# Patient Record
Sex: MALE | Race: WHITE | NOT HISPANIC OR LATINO | Employment: FULL TIME | URBAN - METROPOLITAN AREA
[De-identification: names, ages, dates, MRNs, and addresses within clinical notes are randomized per-mention and may not be internally consistent; named-entity substitution may affect disease eponyms.]

---

## 2017-11-17 ENCOUNTER — HISTORICAL (OUTPATIENT)
Dept: RADIOLOGY | Facility: HOSPITAL | Age: 32
End: 2017-11-17

## 2017-11-28 ENCOUNTER — HISTORICAL (OUTPATIENT)
Dept: ADMINISTRATIVE | Facility: HOSPITAL | Age: 32
End: 2017-11-28

## 2017-11-28 LAB
ALBUMIN SERPL-MCNC: 4.7 GM/DL (ref 3.4–5)
BUN SERPL-MCNC: 15 MG/DL (ref 7–18)
CALCIUM SERPL-MCNC: 9.7 MG/DL (ref 8.5–10.1)
CHLORIDE SERPL-SCNC: 102 MMOL/L (ref 98–107)
CO2 SERPL-SCNC: 28 MMOL/L (ref 21–32)
CREAT SERPL-MCNC: 1.4 MG/DL (ref 0.6–1.3)
FSH SERPL-ACNC: 11.3 MIU/ML (ref 1.5–15)
GLUCOSE SERPL-MCNC: 83 MG/DL (ref 74–106)
LH SERPL-ACNC: 0.4 MIU/ML (ref 1.2–10.6)
PHOSPHATE SERPL-MCNC: 3.2 MG/DL (ref 2.5–4.9)
POTASSIUM SERPL-SCNC: 4.3 MMOL/L (ref 3.5–5.1)
PROLACTIN LEVEL (OHS): 68.2 NG/ML (ref 2.5–17.4)
PTH-INTACT SERPL-MCNC: 51.6 PG/ML (ref 13.8–85)
SODIUM SERPL-SCNC: 139 MMOL/L (ref 136–145)
T4 FREE SERPL-MCNC: 0.11 NG/DL (ref 0.76–1.46)
TSH SERPL-ACNC: >500 MIU/L (ref 0.36–3.74)

## 2017-11-30 ENCOUNTER — HISTORICAL (OUTPATIENT)
Dept: ADMINISTRATIVE | Facility: HOSPITAL | Age: 32
End: 2017-11-30

## 2017-11-30 ENCOUNTER — HISTORICAL (OUTPATIENT)
Dept: RADIOLOGY | Facility: HOSPITAL | Age: 32
End: 2017-11-30

## 2017-11-30 LAB — CORTIS SERPL-SCNC: 13 MCG/DL

## 2017-12-05 ENCOUNTER — HISTORICAL (OUTPATIENT)
Dept: INFUSION THERAPY | Facility: HOSPITAL | Age: 32
End: 2017-12-05

## 2017-12-05 LAB
APPEARANCE, UA: CLEAR
BACTERIA #/AREA URNS AUTO: ABNORMAL /[HPF]
BILIRUB UR QL STRIP: NEGATIVE
COLOR UR: NORMAL
CORTIS 1H P CHAL SERPL-SCNC: 26 MCG/DL
CORTIS 30M P CHAL SERPL-SCNC: 20.6 MCG/DL
CORTIS SERPL-SCNC: 8.5 MCG/DL
GLUCOSE (UA): NORMAL
HGB UR QL STRIP: NEGATIVE
HYALINE CASTS #/AREA URNS LPF: ABNORMAL /[LPF]
KETONES UR QL STRIP: NEGATIVE
LEUKOCYTE ESTERASE UR QL STRIP: NEGATIVE
NITRITE UR QL STRIP: NEGATIVE
PH UR STRIP: 6.5 [PH] (ref 4.5–8)
PROT UR QL STRIP: NEGATIVE
RBC #/AREA URNS AUTO: ABNORMAL /[HPF]
SP GR UR STRIP: 1.01 (ref 1–1.03)
SQUAMOUS #/AREA URNS LPF: ABNORMAL /[LPF]
UROBILINOGEN UR STRIP-ACNC: NORMAL MG/DL
WBC #/AREA URNS AUTO: ABNORMAL /HPF

## 2018-01-10 ENCOUNTER — HISTORICAL (OUTPATIENT)
Dept: FAMILY MEDICINE | Facility: CLINIC | Age: 33
End: 2018-01-10

## 2018-01-10 LAB
T4 FREE SERPL-MCNC: 0.31 NG/DL (ref 0.76–1.46)
TSH SERPL-ACNC: 383 MIU/L (ref 0.36–3.74)

## 2018-02-09 ENCOUNTER — HISTORICAL (OUTPATIENT)
Dept: FAMILY MEDICINE | Facility: CLINIC | Age: 33
End: 2018-02-09

## 2018-02-09 LAB
T4 FREE SERPL-MCNC: 0.54 NG/DL (ref 0.76–1.46)
TSH SERPL-ACNC: 226 MIU/L (ref 0.36–3.74)

## 2018-03-12 ENCOUNTER — HISTORICAL (OUTPATIENT)
Dept: CARDIOLOGY | Facility: CLINIC | Age: 33
End: 2018-03-12

## 2018-03-12 LAB
FSH SERPL-ACNC: 6.2 MIU/ML (ref 1.5–15)
LH SERPL-ACNC: 3.4 MIU/ML (ref 1.2–10.6)
PROLACTIN LEVEL (OHS): 35.3 NG/ML (ref 2.5–17.4)
T4 FREE SERPL-MCNC: 0.9 NG/DL (ref 0.76–1.46)
TSH SERPL-ACNC: 9.88 MIU/L (ref 0.36–3.74)

## 2018-03-19 ENCOUNTER — HISTORICAL (OUTPATIENT)
Dept: RADIOLOGY | Facility: HOSPITAL | Age: 33
End: 2018-03-19

## 2018-05-09 ENCOUNTER — HISTORICAL (OUTPATIENT)
Dept: FAMILY MEDICINE | Facility: CLINIC | Age: 33
End: 2018-05-09

## 2018-05-09 LAB
BUN SERPL-MCNC: 12 MG/DL (ref 7–18)
CALCIUM SERPL-MCNC: 9.3 MG/DL (ref 8.5–10.1)
CHLORIDE SERPL-SCNC: 110 MMOL/L (ref 98–107)
CO2 SERPL-SCNC: 27 MMOL/L (ref 21–32)
CREAT SERPL-MCNC: 0.7 MG/DL (ref 0.6–1.3)
CREAT/UREA NIT SERPL: 17
GLUCOSE SERPL-MCNC: 85 MG/DL (ref 74–106)
POTASSIUM SERPL-SCNC: 4.4 MMOL/L (ref 3.5–5.1)
PROLACTIN LEVEL (OHS): 28.2 NG/ML (ref 2.5–17.4)
SODIUM SERPL-SCNC: 140 MMOL/L (ref 136–145)
T4 FREE SERPL-MCNC: 0.94 NG/DL (ref 0.76–1.46)
TSH SERPL-ACNC: 6.37 MIU/L (ref 0.36–3.74)

## 2018-06-29 ENCOUNTER — HISTORICAL (OUTPATIENT)
Dept: FAMILY MEDICINE | Facility: CLINIC | Age: 33
End: 2018-06-29

## 2018-06-29 LAB
DEPRECATED CALCIDIOL+CALCIFEROL SERPL-MC: 29.49 NG/ML (ref 30–80)
PROLACTIN LEVEL (OHS): 31.6 NG/ML (ref 2.5–17.4)
T4 FREE SERPL-MCNC: 1.03 NG/DL (ref 0.76–1.46)
TSH SERPL-ACNC: 6.11 MIU/L (ref 0.36–3.74)

## 2018-08-03 ENCOUNTER — HISTORICAL (OUTPATIENT)
Dept: ADMINISTRATIVE | Facility: HOSPITAL | Age: 33
End: 2018-08-03

## 2018-08-03 LAB
DEPRECATED CALCIDIOL+CALCIFEROL SERPL-MC: 71.67 NG/ML (ref 30–80)
PROLACTIN LEVEL (OHS): 19.1 NG/ML (ref 2.5–17.4)
T4 FREE SERPL-MCNC: 0.94 NG/DL (ref 0.76–1.46)
TSH SERPL-ACNC: 1.13 MIU/L (ref 0.36–3.74)

## 2018-11-05 ENCOUNTER — HISTORICAL (OUTPATIENT)
Dept: FAMILY MEDICINE | Facility: CLINIC | Age: 33
End: 2018-11-05

## 2018-11-05 LAB
DEPRECATED CALCIDIOL+CALCIFEROL SERPL-MC: 38.4 NG/ML (ref 30–80)
PROLACTIN LEVEL (OHS): 19.3 NG/ML (ref 2.5–17.4)
TSH SERPL-ACNC: 0.41 MIU/L (ref 0.36–3.74)

## 2018-11-29 ENCOUNTER — HISTORICAL (OUTPATIENT)
Dept: RADIOLOGY | Facility: HOSPITAL | Age: 33
End: 2018-11-29

## 2018-11-29 LAB
ALBUMIN SERPL-MCNC: 3.7 GM/DL (ref 3.4–5)
ALBUMIN/GLOB SERPL: 1 RATIO (ref 1–2)
ALP SERPL-CCNC: 179 UNIT/L (ref 45–117)
ALT SERPL-CCNC: 27 UNIT/L (ref 12–78)
AST SERPL-CCNC: 22 UNIT/L (ref 15–37)
BILIRUB SERPL-MCNC: 0.2 MG/DL (ref 0.2–1)
BILIRUBIN DIRECT+TOT PNL SERPL-MCNC: <0.1 MG/DL
BILIRUBIN DIRECT+TOT PNL SERPL-MCNC: ABNORMAL MG/DL
BUN SERPL-MCNC: 16 MG/DL (ref 7–18)
CALCIUM SERPL-MCNC: 9.1 MG/DL (ref 8.5–10.1)
CHLORIDE SERPL-SCNC: 107 MMOL/L (ref 98–107)
CO2 SERPL-SCNC: 28 MMOL/L (ref 21–32)
CREAT SERPL-MCNC: 0.9 MG/DL (ref 0.6–1.3)
GLOBULIN SER-MCNC: 3.7 GM/ML (ref 2.3–3.5)
GLUCOSE SERPL-MCNC: 71 MG/DL (ref 74–106)
POTASSIUM SERPL-SCNC: 4.5 MMOL/L (ref 3.5–5.1)
PROT SERPL-MCNC: 7.4 GM/DL (ref 6.4–8.2)
SODIUM SERPL-SCNC: 142 MMOL/L (ref 136–145)

## 2019-01-30 ENCOUNTER — HISTORICAL (OUTPATIENT)
Dept: ADMINISTRATIVE | Facility: HOSPITAL | Age: 34
End: 2019-01-30

## 2019-01-30 LAB
DEPRECATED CALCIDIOL+CALCIFEROL SERPL-MC: 25.95 NG/ML (ref 30–80)
PROLACTIN LEVEL (OHS): 18.9 NG/ML (ref 2.5–17.4)
T4 FREE SERPL-MCNC: 1.06 NG/DL (ref 0.76–1.46)
TSH SERPL-ACNC: 0.45 MIU/L (ref 0.36–3.74)

## 2019-06-21 ENCOUNTER — HISTORICAL (OUTPATIENT)
Dept: ADMINISTRATIVE | Facility: HOSPITAL | Age: 34
End: 2019-06-21

## 2019-06-21 LAB
ABS NEUT (OLG): 3.97 X10(3)/MCL (ref 2.1–9.2)
ALBUMIN SERPL-MCNC: 3.7 GM/DL (ref 3.4–5)
ALBUMIN/GLOB SERPL: 1 RATIO (ref 1.1–2)
ALP SERPL-CCNC: 149 UNIT/L (ref 45–117)
ALT SERPL-CCNC: 23 UNIT/L (ref 12–78)
AST SERPL-CCNC: 13 UNIT/L (ref 15–37)
BASOPHILS # BLD AUTO: 0.08 X10(3)/MCL
BASOPHILS NFR BLD AUTO: 1 %
BILIRUB SERPL-MCNC: 0.3 MG/DL (ref 0.2–1)
BILIRUBIN DIRECT+TOT PNL SERPL-MCNC: <0.1 MG/DL
BILIRUBIN DIRECT+TOT PNL SERPL-MCNC: ABNORMAL MG/DL
BUN SERPL-MCNC: 16 MG/DL (ref 7–18)
CALCIUM SERPL-MCNC: 9.2 MG/DL (ref 8.5–10.1)
CHLORIDE SERPL-SCNC: 109 MMOL/L (ref 98–107)
CHOLEST SERPL-MCNC: 134 MG/DL
CHOLEST/HDLC SERPL: 4.6 {RATIO} (ref 0–5)
CO2 SERPL-SCNC: 27 MMOL/L (ref 21–32)
CREAT SERPL-MCNC: 0.9 MG/DL (ref 0.6–1.3)
DEPRECATED CALCIDIOL+CALCIFEROL SERPL-MC: 47.73 NG/ML (ref 30–80)
EOSINOPHIL # BLD AUTO: 0.66 10*3/UL
EOSINOPHIL NFR BLD AUTO: 8 %
ERYTHROCYTE [DISTWIDTH] IN BLOOD BY AUTOMATED COUNT: 16.1 % (ref 11.5–14.5)
GLOBULIN SER-MCNC: 3.6 GM/ML (ref 2.3–3.5)
GLUCOSE SERPL-MCNC: 81 MG/DL (ref 74–106)
HCT VFR BLD AUTO: 42.9 % (ref 40–51)
HDLC SERPL-MCNC: 29 MG/DL
HGB BLD-MCNC: 13.2 GM/DL (ref 13.5–17.5)
IMM GRANULOCYTES # BLD AUTO: 0.01 10*3/UL
IMM GRANULOCYTES NFR BLD AUTO: 0 %
LDLC SERPL CALC-MCNC: 79 MG/DL (ref 0–130)
LYMPHOCYTES # BLD AUTO: 2.12 X10(3)/MCL
LYMPHOCYTES NFR BLD AUTO: 26 % (ref 13–40)
MCH RBC QN AUTO: 24.3 PG (ref 26–34)
MCHC RBC AUTO-ENTMCNC: 30.8 GM/DL (ref 31–37)
MCV RBC AUTO: 79 FL (ref 80–100)
MONOCYTES # BLD AUTO: 1.17 X10(3)/MCL
MONOCYTES NFR BLD AUTO: 15 % (ref 4–12)
NEUTROPHILS # BLD AUTO: 3.97 X10(3)/MCL
NEUTROPHILS NFR BLD AUTO: 50 X10(3)/MCL
PLATELET # BLD AUTO: 257 X10(3)/MCL (ref 130–400)
PMV BLD AUTO: 13.3 FL (ref 7.4–10.4)
POTASSIUM SERPL-SCNC: 4.6 MMOL/L (ref 3.5–5.1)
PROT SERPL-MCNC: 7.3 GM/DL (ref 6.4–8.2)
RBC # BLD AUTO: 5.43 X10(6)/MCL (ref 4.5–5.9)
SODIUM SERPL-SCNC: 141 MMOL/L (ref 136–145)
TRIGL SERPL-MCNC: 128 MG/DL
VLDLC SERPL CALC-MCNC: 26 MG/DL
WBC # SPEC AUTO: 8 X10(3)/MCL (ref 4.5–11)

## 2019-06-25 ENCOUNTER — HISTORICAL (OUTPATIENT)
Dept: ADMINISTRATIVE | Facility: HOSPITAL | Age: 34
End: 2019-06-25

## 2019-06-25 LAB
PROLACTIN LEVEL (OHS): 14 NG/ML (ref 2.5–17.4)
T4 FREE SERPL-MCNC: 0.99 NG/DL (ref 0.76–1.46)
TSH SERPL-ACNC: 1.64 MIU/L (ref 0.36–3.74)

## 2019-12-03 ENCOUNTER — HISTORICAL (OUTPATIENT)
Dept: ADMINISTRATIVE | Facility: HOSPITAL | Age: 34
End: 2019-12-03

## 2019-12-03 LAB
BUN SERPL-MCNC: 14 MG/DL (ref 7–18)
CALCIUM SERPL-MCNC: 9.3 MG/DL (ref 8.5–10.1)
CHLORIDE SERPL-SCNC: 108 MMOL/L (ref 98–107)
CO2 SERPL-SCNC: 25 MMOL/L (ref 21–32)
CREAT SERPL-MCNC: 0.9 MG/DL (ref 0.6–1.3)
CREAT/UREA NIT SERPL: 16
GLUCOSE SERPL-MCNC: 83 MG/DL (ref 74–106)
POTASSIUM SERPL-SCNC: 4 MMOL/L (ref 3.5–5.1)
PROLACTIN LEVEL (OHS): 16.7 NG/ML (ref 2.5–17.4)
SODIUM SERPL-SCNC: 141 MMOL/L (ref 136–145)
T4 FREE SERPL-MCNC: 0.99 NG/DL (ref 0.76–1.46)
TSH SERPL-ACNC: 4.66 MIU/L (ref 0.36–3.74)

## 2020-06-11 ENCOUNTER — HISTORICAL (OUTPATIENT)
Dept: ADMINISTRATIVE | Facility: HOSPITAL | Age: 35
End: 2020-06-11

## 2020-06-11 LAB
BUN SERPL-MCNC: 20 MG/DL (ref 7–18)
CALCIUM SERPL-MCNC: 9.4 MG/DL (ref 8.5–10.1)
CHLORIDE SERPL-SCNC: 106 MMOL/L (ref 98–107)
CO2 SERPL-SCNC: 26 MMOL/L (ref 21–32)
CREAT SERPL-MCNC: 0.9 MG/DL (ref 0.6–1.3)
CREAT/UREA NIT SERPL: 22
GLUCOSE SERPL-MCNC: 77 MG/DL (ref 74–106)
POTASSIUM SERPL-SCNC: 3.9 MMOL/L (ref 3.5–5.1)
PROLACTIN LEVEL (OHS): 22 NG/ML (ref 2.5–17.4)
SODIUM SERPL-SCNC: 139 MMOL/L (ref 136–145)
T4 FREE SERPL-MCNC: 1.18 NG/DL (ref 0.76–1.46)
TSH SERPL-ACNC: 19.02 MIU/L (ref 0.36–3.74)

## 2021-02-04 ENCOUNTER — HISTORICAL (OUTPATIENT)
Dept: ADMINISTRATIVE | Facility: HOSPITAL | Age: 36
End: 2021-02-04

## 2021-02-04 LAB
ABS NEUT (OLG): 4.68 X10(3)/MCL (ref 2.1–9.2)
ALBUMIN SERPL-MCNC: 4.4 GM/DL (ref 3.5–5)
ALBUMIN/GLOB SERPL: 1.2 RATIO (ref 1.1–2)
ALP SERPL-CCNC: 94 UNIT/L (ref 40–150)
ALT SERPL-CCNC: 17 UNIT/L (ref 0–55)
AST SERPL-CCNC: 22 UNIT/L (ref 5–34)
BASOPHILS # BLD AUTO: 0.1 X10(3)/MCL (ref 0–0.2)
BASOPHILS NFR BLD AUTO: 1 %
BILIRUB SERPL-MCNC: 0.3 MG/DL
BILIRUBIN DIRECT+TOT PNL SERPL-MCNC: 0.1 MG/DL (ref 0–0.5)
BILIRUBIN DIRECT+TOT PNL SERPL-MCNC: 0.2 MG/DL (ref 0–0.8)
BUN SERPL-MCNC: 12 MG/DL (ref 8.9–20.6)
CALCIUM SERPL-MCNC: 10 MG/DL (ref 8.4–10.2)
CHLORIDE SERPL-SCNC: 104 MMOL/L (ref 98–107)
CHOLEST SERPL-MCNC: 190 MG/DL
CHOLEST/HDLC SERPL: 5 {RATIO} (ref 0–5)
CO2 SERPL-SCNC: 26 MMOL/L (ref 22–29)
CREAT SERPL-MCNC: 0.86 MG/DL (ref 0.73–1.18)
DEPRECATED CALCIDIOL+CALCIFEROL SERPL-MC: 41 NG/ML (ref 30–80)
EOSINOPHIL # BLD AUTO: 0.6 X10(3)/MCL (ref 0–0.9)
EOSINOPHIL NFR BLD AUTO: 7 %
ERYTHROCYTE [DISTWIDTH] IN BLOOD BY AUTOMATED COUNT: 16.3 % (ref 11.5–14.5)
EST CREAT CLEARANCE SER (OHS): 97.38 ML/MIN
GLOBULIN SER-MCNC: 3.6 GM/DL (ref 2.4–3.5)
GLUCOSE SERPL-MCNC: 71 MG/DL (ref 74–100)
HCT VFR BLD AUTO: 42.4 % (ref 40–51)
HDLC SERPL-MCNC: 36 MG/DL (ref 35–60)
HGB BLD-MCNC: 12.7 GM/DL (ref 13.5–17.5)
IMM GRANULOCYTES # BLD AUTO: 0.03 10*3/UL
IMM GRANULOCYTES NFR BLD AUTO: 0 %
LDLC SERPL CALC-MCNC: 132 MG/DL (ref 50–140)
LYMPHOCYTES # BLD AUTO: 3.2 X10(3)/MCL (ref 0.6–4.6)
LYMPHOCYTES NFR BLD AUTO: 33 %
MCH RBC QN AUTO: 22.7 PG (ref 26–34)
MCHC RBC AUTO-ENTMCNC: 30 GM/DL (ref 31–37)
MCV RBC AUTO: 75.7 FL (ref 80–100)
MONOCYTES # BLD AUTO: 0.9 X10(3)/MCL (ref 0.1–1.3)
MONOCYTES NFR BLD AUTO: 9 %
NEUTROPHILS # BLD AUTO: 4.68 X10(3)/MCL (ref 2.1–9.2)
NEUTROPHILS NFR BLD AUTO: 49 %
PLATELET # BLD AUTO: 325 X10(3)/MCL (ref 130–400)
PMV BLD AUTO: 11.7 FL (ref 7.4–10.4)
POTASSIUM SERPL-SCNC: 4.4 MMOL/L (ref 3.5–5.1)
PROLACTIN LEVEL (OHS): 16.34 NG/ML (ref 3.46–19.4)
PROT SERPL-MCNC: 8 GM/DL (ref 6.4–8.3)
RBC # BLD AUTO: 5.6 X10(6)/MCL (ref 4.5–5.9)
SODIUM SERPL-SCNC: 141 MMOL/L (ref 136–145)
T4 FREE SERPL-MCNC: 0.94 NG/DL (ref 0.7–1.48)
TESTOST SERPL-MCNC: 542.77 NG/DL (ref 240.24–870.68)
TRIGL SERPL-MCNC: 110 MG/DL (ref 34–140)
TSH SERPL-ACNC: 7.29 UIU/ML (ref 0.35–4.94)
VLDLC SERPL CALC-MCNC: 22 MG/DL
WBC # SPEC AUTO: 9.5 X10(3)/MCL (ref 4.5–11)

## 2021-03-29 ENCOUNTER — HISTORICAL (OUTPATIENT)
Dept: ADMINISTRATIVE | Facility: HOSPITAL | Age: 36
End: 2021-03-29

## 2021-03-29 LAB
T4 FREE SERPL-MCNC: 0.8 NG/DL (ref 0.7–1.48)
TSH SERPL-ACNC: 7.05 UIU/ML (ref 0.35–4.94)

## 2021-10-05 ENCOUNTER — HISTORICAL (OUTPATIENT)
Dept: ADMINISTRATIVE | Facility: HOSPITAL | Age: 36
End: 2021-10-05

## 2021-10-05 LAB
COLOR STL: NORMAL
CONSISTENCY STL: NORMAL
HEMOCCULT SP1 STL QL: NEGATIVE

## 2021-10-13 ENCOUNTER — HISTORICAL (OUTPATIENT)
Dept: ADMINISTRATIVE | Facility: HOSPITAL | Age: 36
End: 2021-10-13

## 2021-10-13 LAB
DEPRECATED CALCIDIOL+CALCIFEROL SERPL-MC: 49.3 NG/ML (ref 30–80)
T4 FREE SERPL-MCNC: 1.14 NG/DL (ref 0.7–1.48)
TSH SERPL-ACNC: 2.75 UIU/ML (ref 0.35–4.94)

## 2021-11-11 ENCOUNTER — HISTORICAL (OUTPATIENT)
Dept: ADMINISTRATIVE | Facility: HOSPITAL | Age: 36
End: 2021-11-11

## 2021-11-11 LAB
FERRITIN SERPL-MCNC: 5.72 NG/ML (ref 21.81–274.66)
IRON SATN MFR SERPL: 7 % (ref 20–50)
IRON SERPL-MCNC: 28 UG/DL (ref 65–175)
TIBC SERPL-MCNC: 368 UG/DL (ref 69–240)
TIBC SERPL-MCNC: 396 UG/DL (ref 250–450)
TRANSFERRIN SERPL-MCNC: 359 MG/DL (ref 174–364)
TSH SERPL-ACNC: 0.96 UIU/ML (ref 0.35–4.94)

## 2022-04-10 ENCOUNTER — HISTORICAL (OUTPATIENT)
Dept: ADMINISTRATIVE | Facility: HOSPITAL | Age: 37
End: 2022-04-10
Payer: MEDICAID

## 2022-04-26 VITALS
HEIGHT: 63 IN | DIASTOLIC BLOOD PRESSURE: 78 MMHG | SYSTOLIC BLOOD PRESSURE: 119 MMHG | OXYGEN SATURATION: 98 % | WEIGHT: 219.13 LBS | BODY MASS INDEX: 38.83 KG/M2

## 2022-04-28 RX ORDER — POLYETHYLENE GLYCOL 3350 17 G/17G
17 POWDER, FOR SOLUTION ORAL DAILY
COMMUNITY
End: 2023-05-30

## 2022-04-28 RX ORDER — LEVOTHYROXINE SODIUM 175 UG/1
175 TABLET ORAL
COMMUNITY
End: 2022-05-13 | Stop reason: SDUPTHER

## 2022-04-28 RX ORDER — DOCUSATE CALCIUM 240 MG
240 CAPSULE ORAL DAILY
COMMUNITY

## 2022-05-02 DIAGNOSIS — Z01.818 PRE-OP TESTING: ICD-10-CM

## 2022-05-02 NOTE — HISTORICAL [facility]
This is a historical note converted from Mike. Formatting and pictures may have been removed.  Please reference Mike for original formatting and attached multimedia. Chief Complaint  8 month follow up with no complaints  History of Present Illness  32 C M here for 3?month FU of SCFE, primary hypothyroidism, pituitary pseudotumor. s/p transcutaneous in situ pinning of femur bilaterally. 12/3/17.Have over the past?8 months been titrating LT4 to goal w/ [facility] on board.  Most recent labs show pt?remains hypothyroid w/ borderline vit D and borderline prolactin. Plan remains to titrate to euthyroidism to see if prl resolves or needs further workup. ?Otherwise will continue to replete vit d. Rx increased LT4 125mcg po daily.?Rx ergocalciferol 50,000 IU po twice a week??Labs today TSH, fx vit D, prl.  Increased hair growth to legs, facial hair, now shaving, voice deepening/crackling, decreased skin dryness. No increase in pubic hair growth or testicular volume.?Maintaining weight today since may 204lb but states he was down to 195lb 2-3 weeks ago.  Also states dandruff has resolved  Denies headache, changes in vision,polyuria/polydipsia/cold heat intolerance, +gynecomastia, obese.  C/o some discomfort with 2 scalp cysts and L sided Neck cyst both have been there approximately 8-10months.  ?  Review of Systems  Review of Systems?  Constitutional: ?No fever, No chills, No sweats, No weakness. ?  Eye: ?No recent visual problem. ?  Ear/Nose/Mouth/Throat: ?Ear pain. ?  ?? ? Decreased hearing: Bilaterally. ?  Respiratory: ?No shortness of breath, No cough, No sputum production. ?  Cardiovascular: ?No chest pain, No palpitations, No bradycardia. ?  Gastrointestinal: ?No nausea, No vomiting, No diarrhea, No constipation, No heartburn. ?  Genitourinary: ?h/o kidney stones, No dysuria, No hematuria, No change in urine stream. ?  Hematology/Lymphatics: ?No bruising tendency, No bleeding tendency. ?  Endocrine:??per  HPI  Immunologic: ?No recurrent fevers, No recurrent infections. ?  Musculoskeletal: ?Negative except as documented in history of present illness. ?  Integumentary:??per HPI  Neurologic: ?Alert and oriented X4, Abnormal balance, Confusion, Numbness, Tingling. ?  ROS reviewed as documented in chart?  ?  Physical Exam  Vitals & Measurements  T:?36.7? ?C (Oral)? HR:?66(Peripheral)? RR:?18? BP:?114/76? SpO2:?97%?  HT:?159?cm? HT:?159?cm? WT:?92.70?kg? WT:?92.70?kg? BMI:?36.67?  General: ?Alert and oriented, No acute distress. ?  Eye: ?Pupils are equal, round and reactive to light, Extraocular movements are intact, Normal conjunctiva. ?  HENT: ?Normocephalic, Normal hearing, Oral mucosa is moist, No pharyngeal erythema  Neck: ?Supple, Non-tender, No thyromegaly. ?  Respiratory: ?Lungs are clear to auscultation, Respirations are non-labored, Breath sounds are equal, Symmetrical chest wall expansion, No chest wall tenderness. ?  Cardiovascular: ?Normal rate, Regular rhythm, No murmur, No gallop, Good pulses equal in all extremities, Normal peripheral perfusion. ?  Breast: ?No mass, No tenderness, +gynecomastia. ?  Gastrointestinal: ?Soft, Non-tender, Non-distended, Normal bowel sounds, obese. ?  Musculoskeletal: ?No tenderness,?non-TTP at anterior /lateral hip joint bilat ;  ?good tone no muscle atrophy, surgical incisions to bilateral hip/anterior thigh well healed bilaterally  no edema/erythema/ecchymosis bilaterally;sensation to light touch/pain intact  ?RLE: 5/5 strength throughout, full ROM, no deficits or ?increased pain with movement  LLE- 5/5 strnegth througout/ full ROM no deficit  Gait wnl. ?  Integumentary: ?Warm, Dry, Intact, small cystic lesions x 2 (~1cm and 0.5cm respectively) to vertex of scalp, NT, no active drainage, mobile. 1 small cystic lesion ~0.7x0.7mm to L neck,??increased hairgrowth to calfs/shins bilaterally,?increased facil hair to upper lip-pt has also been shaving?+heavier hair growth to  bilateral foerearms / minimal hair to armpits/upper back  numerous small raised nevi across upper back/neck/face  post neck ?acanthosis nigrans nearly resolved to post neck, minimally discolored skin . ?  Feet: ?Normal pulses, Sensation intact. ?  Neurologic: ?Alert, Oriented, Normal sensory, Normal motor function, Cranial Nerves II-XII are grossly intact. ?  Cognition and Speech: ?Oriented, Speech clear and coherent. ?  Psychiatric: ?Cooperative, Appropriate mood & affect, Normal judgment. ?  ?  Assessment/Plan  Hyperprolactinemia  Improving w/achievement of euthyroidism, If does not completely resolve-consider pituitary?MRI.  FU labs today  Ordered:  Clinic Follow up, *Est. 11/03/18 3:00:00 CDT, Order for future visit, Low vitamin D level  Obesity  Hyperprolactinemia  SCFE (slipped capital femoral epiphysis)  Hypothyroid  Sebaceous cyst, [facility]  Free T4, Now collect, 08/03/18 13:05:00 CDT, Blood, Order for future visit, Stop date 08/03/18 13:05:00 CDT, Lab Collect, Primary hypothyroidism  Hyperprolactinemia  SCFE (slipped capital femoral epiphysis), 08/03/18 13:05:00 CDT  Office/Outpatient Visit Level 3 Established [id number] PC, Sebaceous cyst  Primary hypothyroidism  SCFE (slipped capital femoral epiphysis)  Hyperprolactinemia  Low vitamin D level, [facility], 08/03/18 13:54:00 CDT  Prolactin, Routine collect, *Est. 09/07/18 3:00:00 CDT, Blood, Order for future visit, *Est. Stop date 09/07/18 3:00:00 CDT, Lab Collect, Hyperprolactinemia  Low vitamin D level  Hypothyroid, 08/03/18 13:58:00 CDT  Prolactin, Now collect, 08/03/18 13:05:00 CDT, Blood, Order for future visit, Stop date 08/03/18 13:05:00 CDT, Lab Collect, Primary hypothyroidism  Hyperprolactinemia  SCFE (slipped capital femoral epiphysis), 08/03/18 13:05:00 CDT  Thyroid Stimulating Hormone, Routine collect, *Est. 09/07/18 3:00:00 CDT, Blood, Order for future visit, *Est. Stop date 09/07/18 3:00:00 CDT, Lab Collect,  Hyperprolactinemia  Low vitamin D level  Hypothyroid, 08/03/18 13:58:00 CDT  Thyroid Stimulating Hormone, Now collect, 08/03/18 13:05:00 CDT, Blood, Order for future visit, Stop date 08/03/18 13:05:00 CDT, Lab Collect, Primary hypothyroidism  Hyperprolactinemia  SCFE (slipped capital femoral epiphysis), 08/03/18 13:05:00 CDT  Vitamin D, 25-Hydroxy Level, Routine collect, *Est. 09/07/18 3:00:00 CDT, Blood, Order for future visit, *Est. Stop date 09/07/18 3:00:00 CDT, Lab Collect, Hyperprolactinemia  Low vitamin D level  Hypothyroid, 08/03/18 13:59:00 CDT  ?  Low vitamin D level  ?FU vitamin D level today to determine?need for further supplementation  Ordered:  Clinic Follow up, *Est. 11/03/18 3:00:00 CDT, Order for future visit, Low vitamin D level  Obesity  Hyperprolactinemia  SCFE (slipped capital femoral epiphysis)  Hypothyroid  Sebaceous cyst, [facility]  Office/Outpatient Visit Level 3 Established [id number] PC, Sebaceous cyst  Primary hypothyroidism  SCFE (slipped capital femoral epiphysis)  Hyperprolactinemia  Low vitamin D level, [facility], 08/03/18 13:54:00 CDT  Prolactin, Routine collect, *Est. 09/07/18 3:00:00 CDT, Blood, Order for future visit, *Est. Stop date 09/07/18 3:00:00 CDT, Lab Collect, Hyperprolactinemia  Low vitamin D level  Hypothyroid, 08/03/18 13:58:00 CDT  Thyroid Stimulating Hormone, Routine collect, *Est. 09/07/18 3:00:00 CDT, Blood, Order for future visit, *Est. Stop date 09/07/18 3:00:00 CDT, Lab Collect, Hyperprolactinemia  Low vitamin D level  Hypothyroid, 08/03/18 13:58:00 CDT  Vitamin D 25-Hydroxy, D2 + D3-[facility] [id number], Routine collect, *Est. 08/07/18 3:00:00 CDT, Blood, Order for future visit, *Est. Stop date 08/07/18 3:00:00 CDT, Lab Collect, Hypothyroid  Low vitamin D level, 08/07/18 3:00:00 CDT  Vitamin D, 25-Hydroxy Level, Routine collect, *Est. 09/07/18 3:00:00 CDT, Blood, Order for future visit, *Est. Stop date 09/07/18 3:00:00 CDT, Lab  Collect, Hyperprolactinemia  Low vitamin D level  Hypothyroid, 08/03/18 13:59:00 CDT  Vitamin D, 25-Hydroxy Level, Routine collect, 08/03/18 13:37:00 CDT, Blood, Order for future visit, Stop date 08/03/18 13:37:00 CDT, Lab Collect, Low vitamin D level, 08/03/18 13:37:00 CDT  ?  Obesity  Ordered:  Clinic Follow up, *Est. 11/03/18 3:00:00 CDT, Order for future visit, Low vitamin D level  Obesity  Hyperprolactinemia  SCFE (slipped capital femoral epiphysis)  Hypothyroid  Sebaceous cyst, [facility]  ?  Primary hypothyroidism  cont LT4 supplementation with titration as appropriate based on laboratory values  Currently on 125mcg po daily x past 5 weeks  Ordered:  Clinic Follow up, *Est. 11/03/18 3:00:00 CDT, Order for future visit, Low vitamin D level  Obesity  Hyperprolactinemia  SCFE (slipped capital femoral epiphysis)  Hypothyroid  Sebaceous cyst, [facility]  Free T4, Now collect, 08/03/18 13:05:00 CDT, Blood, Order for future visit, Stop date 08/03/18 13:05:00 CDT, Lab Collect, Primary hypothyroidism  Hyperprolactinemia  SCFE (slipped capital femoral epiphysis), 08/03/18 13:05:00 CDT  MD to Nurse, 08/03/18 13:05:00 CDT  Office/Outpatient Visit Level 3 Established [id number] PC, Sebaceous cyst  Primary hypothyroidism  SCFE (slipped capital femoral epiphysis)  Hyperprolactinemia  Low vitamin D level, [facility], 08/03/18 13:54:00 CDT  Prolactin, Routine collect, *Est. 09/07/18 3:00:00 CDT, Blood, Order for future visit, *Est. Stop date 09/07/18 3:00:00 CDT, Lab Collect, Hyperprolactinemia  Low vitamin D level  Hypothyroid, 08/03/18 13:58:00 CDT  Prolactin, Now collect, 08/03/18 13:05:00 CDT, Blood, Order for future visit, Stop date 08/03/18 13:05:00 CDT, Lab Collect, Primary hypothyroidism  Hyperprolactinemia  SCFE (slipped capital femoral epiphysis), 08/03/18 13:05:00 CDT  Thyroid Stimulating Hormone, Routine collect, *Est. 09/07/18 3:00:00 CDT, Blood, Order for  future visit, *Est. Stop date 09/07/18 3:00:00 CDT, Lab Collect, Hyperprolactinemia  Low vitamin D level  Hypothyroid, 08/03/18 13:58:00 CDT  Thyroid Stimulating Hormone, Now collect, 08/03/18 13:05:00 CDT, Blood, Order for future visit, Stop date 08/03/18 13:05:00 CDT, Lab Collect, Primary hypothyroidism  Hyperprolactinemia  SCFE (slipped capital femoral epiphysis), 08/03/18 13:05:00 CDT  Vitamin D 25-Hydroxy, D2 + D3-[facility] [id number], Routine collect, *Est. 08/07/18 3:00:00 CDT, Blood, Order for future visit, *Est. Stop date 08/07/18 3:00:00 CDT, Lab Collect, Hypothyroid  Low vitamin D level, 08/07/18 3:00:00 CDT  Vitamin D, 25-Hydroxy Level, Routine collect, *Est. 09/07/18 3:00:00 CDT, Blood, Order for future visit, *Est. Stop date 09/07/18 3:00:00 CDT, Lab Collect, Hyperprolactinemia  Low vitamin D level  Hypothyroid, 08/03/18 13:59:00 CDT  ?  SCFE (slipped capital femoral epiphysis)  Ordered:  Clinic Follow up, *Est. 11/03/18 3:00:00 CDT, Order for future visit, Low vitamin D level  Obesity  Hyperprolactinemia  SCFE (slipped capital femoral epiphysis)  Hypothyroid  Sebaceous cyst, [facility]  Free T4, Now collect, 08/03/18 13:05:00 CDT, Blood, Order for future visit, Stop date 08/03/18 13:05:00 CDT, Lab Collect, Primary hypothyroidism  Hyperprolactinemia  SCFE (slipped capital femoral epiphysis), 08/03/18 13:05:00 CDT  Office/Outpatient Visit Level 3 Established [id number] PC, Sebaceous cyst  Primary hypothyroidism  SCFE (slipped capital femoral epiphysis)  Hyperprolactinemia  Low vitamin D level, [facility], 08/03/18 13:54:00 CDT  Prolactin, Now collect, 08/03/18 13:05:00 CDT, Blood, Order for future visit, Stop date 08/03/18 13:05:00 CDT, Lab Collect, Primary hypothyroidism  Hyperprolactinemia  SCFE (slipped capital femoral epiphysis), 08/03/18 13:05:00 CDT  Thyroid Stimulating Hormone, Now collect, 08/03/18 13:05:00 CDT, Blood, Order for future visit, Stop date 08/03/18  13:05:00 CDT, Lab Collect, Primary hypothyroidism  Hyperprolactinemia  SCFE (slipped capital femoral epiphysis), 08/03/18 13:05:00 CDT  ?  Sebaceous cyst  to scalp /L neck  referral placed to [facility] Procedures clinic for removal  ?  ?  ?  FU in [facility] in 3 months w/ repeat labs per Endocrine Recs /BP/Height  FU labs in 5 weeks per Endocrine with FU with any medication changes  ?  Ordered:  Clinic Follow up, *Est. 11/03/18 3:00:00 CDT, Order for future visit, Low vitamin D level  Obesity  Hyperprolactinemia  SCFE (slipped capital femoral epiphysis)  Hypothyroid  Sebaceous cyst, [facility]  Internal Referral to  Minor Surgery, sebaceous cyst of neck and scalp, *Est. 09/03/18 3:00:00 CDT, Future Visit?, Sebaceous cyst  Office/Outpatient Visit Level 3 Established [id number] PC, Sebaceous cyst  Primary hypothyroidism  SCFE (slipped capital femoral epiphysis)  Hyperprolactinemia  Low vitamin D level, [facility], 08/03/18 13:54:00 CDT  ?   Problem List/Past Medical History  Ongoing  Obesity  Primary hypothyroidism  SCFE (slipped capital femoral epiphysis)  Historical  No qualifying data  Procedure/Surgical History  Hip joint closure (2017)  denies   Medications  ergocalciferol 50,000 intl units (1.25 mg) oral capsule, See Instructions,? ?Not Taking, Completed Rx  levothyroxine 112 mcg (0.112 mg) oral tablet, 112 mcg= 1 tab(s), Oral, Daily,? ?Not Taking per Prescriber  levothyroxine 125 mcg (0.125 mg) oral tablet, 125 mcg= 1 tab(s), Oral, Daily  Allergies  HYDROcodone?(nausea)  Social History  Alcohol  Never, 11/13/2017  Employment/School  Part time, Activity level: Occasional physical work. Highest education level: High school. Workplace hazards: Heavy lifting/twisting., 05/15/2018  Exercise  Exercise duration: 60. Exercise frequency: 5-6 times/week. Exercise type: Running, Weight lifting., 05/15/2018  Home/Environment  Lives with Father, Mother. Living situation: Home/Independent.,  05/15/2018  Nutrition/Health  Regular, 05/15/2018  Substance Abuse  Never, 11/13/2017  Tobacco  Never smoker, 11/13/2017  Family History  Hypertension.: Father.  Primary malignant neoplasm of prostate: Father.  Immunizations  Vaccine Date Status   influenza virus vaccine, inactivated 11/28/2017 Given   Health Maintenance  Health Maintenance  ???Pending?(in the next year)  ??? ??Due?  ??? ? ? ?Alcohol Misuse Screening due??08/03/18??and every 1??year(s)  ??? ? ? ?Tetanus Vaccine due??08/03/18??and every 10??year(s)  ??? ??Due In Future?  ??? ? ? ?Blood Pressure Screening not due until??05/16/19??and every 1??year(s)  ??? ? ? ?Body Mass Index Check not due until??05/16/19??and every 1??year(s)  ??? ? ? ?Depression Screening not due until??05/16/19??and every 1??year(s)  ???Satisfied?(in the past 1 year)  ??? ??Satisfied?  ??? ? ? ?Blood Pressure Screening on??08/03/18.??Satisfied by Gabby Slaughter LPN  ??? ? ? ?Body Mass Index Check on??08/03/18.??Satisfied by Gabby Slaughter LPN  ??? ? ? ?Depression Screening on??08/03/18.??Satisfied by Gabby Slaughter LPN  ??? ? ? ?Influenza Vaccine on??11/28/17.??Satisfied by Allyson Fabian RN  ??? ? ? ?Obesity Screening on??08/03/18.??Satisfied by Gabby Slaughter LPN  ?  ?      Reviewed clinic note. ?Appropriate plan of care. ?  ?

## 2022-05-02 NOTE — HISTORICAL [facility] CERNER
This is a historical note converted from [place]. Formatting and pictures may have been removed.  Please reference Mike for original formatting and attached multimedia. Chief Complaint  3 month f/u for hypothyroidism  History of Present Illness  33 C M here for 3?month FU of SCFE, primary hypothyroidism, pituitary pseudotumor. s/p transcutaneous in situ pinning of femur bilaterally. 12/3/17.  Followed by endocrinology last seen 12/2018 with no adjustment in his medications -scheduled for 6 mo FU next week  labs ordered-will obtain today  ?   Compliant?with?125 mcg synthroid every morning early before eating.?Denies any complaints. No joint/hip pain. weight training/running for exercise.?  Notes no additional noticeable changes. shaves hair on face and legs.  ?   s/p?resection of?fibroepithelial polyp?and intradermal nevi?face?multiple sebaceous cysts?excised from his?scalp. Most recently nevi/skin tags of bilateral cheeks and bridge of nose 4/9 with benign pathology.  ?  -taking 800IU vitamin D daily-uses OTC  ?   Denies any other issues or complaints.  Review of Systems  Negative except as stated in HPI  Physical Exam  Vitals & Measurements  T:?36.5? ?C (Oral)? HR:?66(Peripheral)? RR:?20? BP:?116/76? SpO2:?99%?  HT:?159?cm? WT:?90.20?kg? BMI:?35.68?  General: Alert and oriented, No acute distress.  Eye: Pupils are equal, round and reactive to light, Extraocular movements are intact.  HENT: Normocephalic.  Neck: Supple, Non-tender  Respiratory: Lungs are clear to auscultation, Respirations are non-labored, Breath sounds are equal, Symmetrical chest wall expansion.  Cardiovascular: Normal rate, Regular rhythm, No murmur.  Gastrointestinal: Soft, Non-tender, Non-distended, Normal bowel sounds.  Musculoskeletal: Normal range of motion.  Integumentary: Warm, Dry, Intact.?appropriate hair growth to bilateral arms, headsmall amount of facial/leg hair 2/2 shaving, 0.4 mm flesh colored nevi to R cheek  Neurologic: No focal  deficits, ambulates unassisted  Assessment/Plan  ?Hypothyroidism  ??-continue Synthroid 125 mcg qd  ?? -TSH wnl 1/2019-  ?? -repeat labs today  ?? - symptoms improved, keep scheduled ?follow up with Endocrine  ?  ?Prolactinoma  ??-on MRI 11/2018-will medically manage given absence of symptoms per endocrine  ? - keep scheduled FU with Endocrine  ?   ?  Vitamin D deficiency  ??-complaint with Vit D 800IU qd  ?-recheck vit d level today  ?  ?   ?  HM  -annual depression screening: UTD  -annual alcohol screening: UTD  -labs: CBC, BMP, FLP : ordered today  -HIV screening: needs-refuses today  -Vaccinations:  ?-Flu: offer this season  ?-Tdap:?unsure last date-due now  ?   Keep scheduled FU with endocrinology  RTC in 3 months with PCP  ?   ?  ?   Problem List/Past Medical History  Ongoing  Obesity  Primary hypothyroidism  Prolactin increased  SCFE (slipped capital femoral epiphysis)  Historical  No qualifying data  Procedure/Surgical History  Hip joint closure (2017)  denies   Medications  levothyroxine 125 mcg (0.125 mg) oral tablet, 125 mcg= 1 tab(s), Oral, Daily, 2 refills  Vitamin D3 oral tablet 400 intl units, 800 IntUnit= 2 tab(s), Oral, Daily, 1 refills  Allergies  HYDROcodone?(nausea)  Social History  Abuse/Neglect  No, No, Yes, 06/21/2019  Alcohol  Never, 11/13/2017  Employment/School  Part time, Activity level: Occasional physical work. Highest education level: High school. Workplace hazards: Heavy lifting/twisting., 05/15/2018  Exercise  Exercise duration: 60. Exercise frequency: 5-6 times/week. Exercise type: Walking, Weight lifting., 12/04/2018  Home/Environment  Lives with Father, Mother. Living situation: Home/Independent., 12/04/2018  Nutrition/Health  Regular, 05/15/2018  Sexual  Sexually active: No. Sexual orientation: Straight or heterosexual. History of sexual abuse: No. Gender Identity Identifies as male., 03/26/2019  Substance Use  Never, 11/12/2018  Tobacco  Never (less than 100 in lifetime), N/A,  06/21/2019  Family History  Hypertension.: Father.  Primary malignant neoplasm of prostate: Father.  Immunizations  Vaccine Date Status   influenza virus vaccine, inactivated 11/12/2018 Given   influenza virus vaccine, inactivated 11/28/2017 Given   Health Maintenance  Health Maintenance  ???Pending?(in the next year)  ??? ??Due?  ??? ? ? ?Tetanus Vaccine due??06/21/19??and every 10??year(s)  ??? ??Due In Future?  ??? ? ? ?Obesity Screening not due until??01/01/20??and every 1??year(s)  ??? ? ? ?Alcohol Misuse Screening not due until??03/26/20??and every 1??year(s)  ??? ? ? ?Blood Pressure Screening not due until??04/08/20??and every 1??year(s)  ??? ? ? ?Body Mass Index Check not due until??04/08/20??and every 1??year(s)  ??? ? ? ?Depression Screening not due until??04/08/20??and every 1??year(s)  ???Satisfied?(in the past 1 year)  ??? ??Satisfied?  ??? ? ? ?ADL Screening on??06/21/19.??Satisfied by Zina Pike LPN  ??? ? ? ?Blood Pressure Screening on??06/21/19.??Satisfied by Zach Pike LPNetria  ??? ? ? ?Body Mass Index Check on??06/21/19.??Satisfied by Ana Pike LPNia  ??? ? ? ?Depression Screening on??06/21/19.??Satisfied by Glendy DEWITT Zina  ??? ? ? ?Influenza Vaccine on??11/12/18.??Satisfied by Renata Omer RN  ??? ? ? ?Obesity Screening on??06/21/19.??Satisfied by Glendy DEWITT Zina  ?  ?      HPI / PE reviewed. Agree with assessment; Management and plan of care appropriate.

## 2022-05-02 NOTE — HISTORICAL [facility]
This is a historical note converted from [employer]. Formatting and pictures may have been removed.  Please reference Mike for original formatting and attached multimedia. Chief Complaint  F/U APPT, MICROPOLATINOMA, HYPOTHYROIDISM; HX HYPOGONADISM; VIT D DEF; Labs not done.  History of Present Illness  34 y/o male with a PMHx of hypothyroidism, hyperprolactinemia w/ hypogonadism, vitiman d deficiency presents to Endocrinology visit for routine follow up.? Patient has been doing well with no problems.? He is compliant with all of his medications.? He is tolerating LT4 without any issues.? His libido is good he reports.? No other complaints.? He denies any chest pain, SOB, nausea, vomiting, fever, or chills.  ?  Review of Systems  14 pt review of systems was obtained and negative except as documented in HPI  Physical Exam  Vitals & Measurements  T:?98.3? ?F (Oral)? HR:?60(Peripheral)? RR:?20? BP:?128/85?  HT:?158?cm? WT:?91?kg? BMI:?36.45?  General: A&O, NAD  Eye: PERRLA, EOMI, nml conjunctiva  Respiratory: CTA in all lung fields, non-labored, BS equal  Cardiovascular: RRR no M/R/G, distal pulses palpated and symmetric  GI: Soft, NT, active BS  : Soft, No CVA tenderness, No suprapubic tenderness  Skin: Warm, no rash  Neuro: Cranial Nerves II-XII are grossly intact, No focal deficits, Normal motor function  Psychiatric: Cooperative, Appropriate mood & affect  ?  Assessment/Plan  History of hypogonadism?Z86.39  ?resolved  History of slipped capital femoral epiphysis?Z87.39  ?continue to follow in ortho  Hypothyroidism?E03.9  ?Repeat TSH and Free T4 today  Continue Levothyroxine 125mcg daily  Microprolactinoma?D35.2  ?Repeat prolactin level today  Vitamin D deficiency?E55.9  ?continue maintenance  ?  ?  ?  ?  RTC to be determined.? Follow up labs   Problem List/Past Medical History  Ongoing  Obesity  Primary hypothyroidism  Prolactin increased  SCFE (slipped capital femoral epiphysis)  Historical  No qualifying  data  Procedure/Surgical History  Hip joint closure (2017)  denies   Medications  levothyroxine 125 mcg (0.125 mg) oral tablet, 125 mcg= 1 tab(s), Oral, Daily, 2 refills  Vitamin D3 oral tablet 400 intl units, 800 IntUnit= 2 tab(s), Oral, Daily, 1 refills  Allergies  HYDROcodone?(nausea)  Social History  Abuse/Neglect  No, 06/25/2019  Alcohol  Never, 11/13/2017  Employment/School  Part time, Activity level: Occasional physical work. Highest education level: High school. Workplace hazards: Heavy lifting/twisting., 05/15/2018  Exercise  Exercise duration: 60. Exercise frequency: 5-6 times/week. Exercise type: Walking, Weight lifting., 12/04/2018  Home/Environment  Lives with Father, Mother. Living situation: Home/Independent., 12/04/2018  Nutrition/Health  Regular, 05/15/2018  Sexual  Sexually active: No. Sexual orientation: Straight or heterosexual. History of sexual abuse: No. Gender Identity Identifies as male., 03/26/2019  Spiritual/Cultural  Muslim, 06/25/2019  Substance Use  Never, 11/12/2018  Tobacco  Never (less than 100 in lifetime), Cigarettes, N/A, 06/25/2019  Family History  Hypertension.: Father.  Primary malignant neoplasm of prostate: Father.  Immunizations  Vaccine Date Status   influenza virus vaccine, inactivated 11/12/2018 Given   influenza virus vaccine, inactivated 11/28/2017 Given   Health Maintenance  Health Maintenance  ???Pending?(in the next year)  ??? ??OverDue  ??? ? ? ?Diabetes Screening due??and every?  ??? ??Due?  ??? ? ? ?Tetanus Vaccine due??06/25/19??and every 10??year(s)  ??? ??Due In Future?  ??? ? ? ?Obesity Screening not due until??01/01/20??and every 1??year(s)  ??? ? ? ?Alcohol Misuse Screening not due until??03/26/20??and every 1??year(s)  ??? ? ? ?ADL Screening not due until??06/21/20??and every 1??year(s)  ??? ? ? ?Blood Pressure Screening not due until??06/24/20??and every 1??year(s)  ??? ? ? ?Body Mass Index Check not due until??06/24/20??and every 1??year(s)  ??? ? ?  ?Depression Screening not due until??06/24/20??and every 1??year(s)  ???Satisfied?(in the past 1 year)  ??? ??Satisfied?  ??? ? ? ?ADL Screening on??06/21/19.??Satisfied by Zina Pike LPN  ??? ? ? ?Blood Pressure Screening on??06/25/19.??Satisfied by Lanie Mcmanus RN  ??? ? ? ?Body Mass Index Check on??06/25/19.??Satisfied by Lanie Mcmanus RN  ??? ? ? ?Depression Screening on??06/25/19.??Satisfied by Lanie Mcmanus RN  ??? ? ? ?Diabetes Screening on??06/21/19.??Satisfied by Gilles Navarro Jr.  ??? ? ? ?Influenza Vaccine on??11/12/18.??Satisfied by Renata Omer RN  ??? ? ? ?Obesity Screening on??06/25/19.??Satisfied by Lanie Mcmanus RN  ?      I saw the patient with the resident and discussed the case, assisted with the development of the assessment and agree with the plan of care.? Given absence of signs/symptoms, agreed barring changes to follow rather than tx the hyperprolactinemia.

## 2022-05-03 NOTE — HISTORICAL [facility]
This is a historical note converted from [employer]. Formatting and pictures may have been removed.  Please reference Mike for original formatting and attached multimedia. Chief Complaint  6 weeks f/u visit  History of Present Illness  34 C M pmhx SCFE, primary hypothyroidism, pituitary pseudotumor s/p transcutaneous in situ pinning of femur bilaterally 12/3/17.  previously followed by??[facility] clinic here for FU  ?  Discharged from endocrine clinic 2019 since hyperprolactinemia resolved, insurance denied repeat MRI, MRI?  revealed a right pituitary microadenoma  TSH elevated at that time at 4.60, stated he had occasionally missed doses of LT4  gained 10 lb since December, stopped working out during COVID, just restarted since COVID  has been working at [employer]  his father also  2019 but he is coping well  ?  states he has been taking LT4 first thing in morning at same time as OTC GERD Rx and vitamin D -reinforced need to take medication first this in am, empty stomach at least 30min to hour before any other medication or supplements  ?  Denies any other acute issues or complaints  Review of Systems  Constitutional: No fever. No chills. No weakness.  Eye:?No blurry vision.?  Respiratory: No shortness of breath.?  Cardiovascular: No chest pain. No palpitations. No edema.?  Gastrointestinal: No abdominal pain, no constipation, no diarrhea or vomiting  Musculoskeletal: No joint pain, no tenderness  Integumentary: No rashes. No lesions.?  Neurologic: No numbness. No tingling, No headache.??  Physical Exam  Vitals & Measurements  T:?36.9? ?C (Oral)? HR:?94(Peripheral)? RR:?22? BP:?122/80? SpO2:?99%?  HT:?160?cm? WT:?97.6?kg? BMI:?38.13?  General: Alert and oriented, No acute distress.  Eye: Pupils are equal, round and reactive to light, Extraocular movements are intact.  HENT: Normocephalic.  Neck: Supple, Non-tender  Respiratory: Lungs are clear to auscultation, Respirations are  non-labored, Breath sounds are equal, Symmetrical chest wall expansion.  Cardiovascular: Normal rate, Regular rhythm, No murmur.  Gastrointestinal: Soft, Non-tender, Non-distended, Normal bowel sounds.  Musculoskeletal: Normal range of motion. no tenderness  Integumentary: Warm, Dry, Intact.?appropriate facial/arm/leg/axillary hair growth  Neurologic: No focal deficits, ambulates unassisted  Assessment/Plan  Hyperprolactinemia?E22.1  Ordered:  Clinic Follow up, *Est. 12/11/20 3:00:00 CST, Order for future visit, Hypothyroidism  Hyperprolactinemia, [facility]  ?  Hypothyroidism?E03.9  recheck labs today TSH/T4/prolactin/BMP  will call pt w/results  reinforced proper administration of LT4  as well as improved diet/exercise  adjust medications as necessary however if abnormal this is likely due to improper administration  ?  FU labs in 6-8weeks  Ordered:  Clinic Follow up, *Est. 12/11/20 3:00:00 CST, Order for future visit, Hypothyroidism  Hyperprolactinemia, [facility]  ?   Problem List/Past Medical History  Ongoing  Obesity  Primary hypothyroidism  Prolactin increased  SCFE (slipped capital femoral epiphysis)  Vitamin D deficiency  Historical  No qualifying data  Procedure/Surgical History  Hip joint closure (2017)  denies   Medications  levothyroxine 125 mcg (0.125 mg) oral tablet, 125 mcg= 1 tab(s), Oral, Daily, 3 refills  Vitamin D3 oral tablet 400 intl units, 800 IntUnit= 2 tab(s), Oral, Daily, 1 refills  Allergies  HYDROcodone?(nausea)  Social History  Abuse/Neglect  No, No, Yes, 06/11/2020  No, No, Yes, 12/10/2019  No, No, Yes, 11/25/2019  Alcohol  Never, 12/10/2019  Never, 11/13/2017  Employment/School  Part time, Activity level: Occasional physical work. Highest education level: High school. Workplace hazards: Heavy lifting/twisting., 05/15/2018  Exercise  Exercise duration: 60. Exercise frequency: 5-6 times/week. Exercise type: Walking, Weight lifting.,  12/04/2018  Home/Environment  Lives with Father, Mother. Living situation: Home/Independent., 12/04/2018  Nutrition/Health  Regular, 05/15/2018  Sexual  Sexually active: No. Sexual orientation: Straight or heterosexual. History of sexual abuse: No. Gender Identity Identifies as male., 03/26/2019  Spiritual/Cultural  Jain, 06/25/2019  Substance Use  Never, 12/10/2019  Never, 11/12/2018  Tobacco  Never (less than 100 in lifetime), N/A, 06/11/2020  Never (less than 100 in lifetime), N/A, 12/10/2019  Family History  Hypertension.: Father.  Primary malignant neoplasm of prostate: Father.  Immunizations  Vaccine Date Status   influenza virus vaccine, inactivated 11/25/2019 Given   tetanus/diphtheria/pertussis, acel(Tdap) 08/26/2019 Given   influenza virus vaccine, inactivated 11/12/2018 Given   influenza virus vaccine, inactivated 11/28/2017 Given   Health Maintenance  Health Maintenance  ???Pending?(in the next year)  ??? ??OverDue  ??? ? ? ?Diabetes Screening due??and every?  ??? ??Due In Future?  ??? ? ? ?ADL Screening not due until??11/25/20??and every 1??year(s)  ??? ? ? ?Hypertension Management-BMP not due until??12/02/20??and every 1??year(s)  ??? ? ? ?Alcohol Misuse Screening not due until??01/01/21??and every 1??year(s)  ??? ? ? ?Obesity Screening not due until??01/01/21??and every 1??year(s)  ???Satisfied?(in the past 1 year)  ??? ??Satisfied?  ??? ? ? ?ADL Screening on??11/25/19.??Satisfied by Keisha Osborne LPN  ??? ? ? ?Alcohol Misuse Screening on??06/11/20.??Satisfied by Keisha Osborne LPN  ??? ? ? ?Blood Pressure Screening on??06/11/20.??Satisfied by Keisha Osborne LPN  ??? ? ? ?Body Mass Index Check on??06/11/20.??Satisfied by Keisha Osborne LPN  ??? ? ? ?Depression Screening on??06/11/20.??Satisfied by Keisha Osborne LPN  ??? ? ? ?Diabetes Screening on??12/03/19.??Satisfied by Gabby Ruiz  ??? ? ? ?Hypertension Management-Blood Pressure on??06/11/20.??Satisfied by  Keisha Osborne LPN  ??? ? ? ?Influenza Vaccine on??11/25/19.??Satisfied by Keisha Osborne LPN  ??? ? ? ?Obesity Screening on??06/11/20.??Satisfied by Keisha Osborne LPN  ??? ? ? ?Tetanus Vaccine on??08/26/19.??Satisfied by Ana Gabriel  ?      The chart was reviewed. I agree with the assessment and plan. Care provided was reasonable and necessary.

## 2022-05-03 NOTE — HISTORICAL [facility] CERN[facility]
This is a historical note converted from Mike. Formatting and pictures may have been removed.  Please reference Mike for original formatting and attached multimedia. Chief Complaint  F/u ?hemorrhoids  History of Present Illness  Mr. Whiting is a 35 yo M w/ PMHx of Hypothyroidism, Vitamin D Def and Class II Obesity who presents for f/u of BRBPR s/p diagnosis of external hemorrhoid in ED.?  ?   External Hemorrhoids:  previously with small volume painless BRBPR only when wiping that progressed to painful BM and with sitting prompting ED visit where he was dxd with non-thrombosed external hemorrhoid with no anal fissure noted on exam. Was prescribed Anusol and bought OTC cooling gel with quick relief of pain. Today, reports symptoms have resolved. No longer having pain with BM or BRBPR when wiping. Reports ensuring adequate hydration and takes stool softener q3days to prevent constipation and recurrences. Has increased fiber in diet. Denies any lightheadedness, dizziness, or fatigue. On chart review, GI referral sent for investigation of?additional causes of GI bleed.  ?   Microcytic Anemia:  anemic to 12.7 with MCV in the 70s pre-dating onset of hemorrhoid symptoms. Denies weakness, fatigue, lightheadedness, SOB, CP.  ?   Hypothyroidism:  Recently increased Synthroid to 175 mcg from 150 mcg after 10/5 appt. TFTs ordered on 10/13 wnl.  Review of Systems  Constitutional:?no fevers, chills or fatigue  Respiratory:?no trouble breathing or shortness of breath  Cardiovascular:?no chest pain or tightness,?no shortness breath on activity  Gastrointestinal:no constipation,?no diarrhea,?no nausea,?no vomiting  Neurologic: no dizziness, no fainting  Physical Exam  Vitals & Measurements  T:?36.7? ?C (Oral)? HR:?76(Peripheral)? RR:?20? BP:?125/84? SpO2:?100%?  HT:?160.00?cm? WT:?97.700?kg? BMI:?38.16?  General:?Alert and oriented,?no acute distress  Respiratory:?Lungs clear to auscultation bilaterally,?No increased work of  breathing  Cardiovascular:?S1-S2, regular rate, regular rhythm,?2+ radial pulses,?No lower extremity edema  Gastrointestinal:?Soft, nontender, nondistended  Musculoskeletal: Appropriate movement of extremities bilaterally  Assessment/Plan  1.?Hemorrhoid?K64.9  ?discussed importance of maintaining high fiber diet and prevention of constipation to prevent recurrence. Pt agrees and verbalized understanding as he does not want any recurrence of the hemorrhoid  Ordered:  Clinic Follow up, *Est. 05/11/22 3:00:00 CDT, Order for future visit, Hemorrhoid  Hypothyroid, [facility]  ?  2.?Microcytic anemia?D50.9,?Microcytic anemia?D50.9  ?will order MINGO work-up. GI referral already placed.  ?  3.?Primary hypothyroidism?E03.9  ?TFT wnl, continue Synthroid at 175mcg  Ordered:  levothyroxine, 175 mcg = 1 cap(s), Oral, Daily, 30 to 60 minutes before breakfast, # 30 cap(s), 1 Refill(s), Pharmacy: [facility], 160, cm, Height/Length Dosing, 11/11/21 13:33:00 CST, 97.7, kg, Weight Dosing, 11/11/21 13:33:00 CST  Clinic Follow up, *Est. 05/11/22 3:00:00 CDT, Order for future visit, Hemorrhoid  Hypothyroid, [facility]  ?  Referrals  Clinic Follow up, *Est. 05/11/22 3:00:00 CDT, Order for future visit, Hemorrhoid  Hypothyroid, [facility]   Problem List/Past Medical History  Ongoing  Obesity  Primary hypothyroidism  Prolactin increased  SCFE (slipped capital femoral epiphysis)  Vitamin D deficiency  Historical  No qualifying data  Procedure/Surgical History  Hip joint closure (2017)  denies   Medications  levothyroxine 175 mcg (0.175 mg) oral capsule, 175 mcg= 1 cap(s), Oral, Daily, 1 refills  Allergies  HYDROcodone?(nausea)  Social History  Abuse/Neglect  No, No, Yes, 11/11/2021  Alcohol  Never, 12/10/2019  Never, 11/13/2017  Employment/School  Part time, Activity level: Occasional physical work. Highest education level: High school. Workplace hazards: Heavy lifting/twisting.,  05/15/2018  Exercise  Exercise duration: 60. Exercise frequency: 5-6 times/week. Exercise type: Walking, Weight lifting., 12/04/2018  Home/Environment  Lives with Father, Mother. Living situation: Home/Independent., 12/04/2018  Nutrition/Health  Regular, 05/15/2018  Sexual  Sexually active: No. Sexual orientation: Straight or heterosexual. History of sexual abuse: No. Gender Identity Identifies as male., 03/26/2019  Spiritual/Cultural  Caodaism, 06/25/2019  Substance Use  Never, 11/12/2018  Tobacco  Never (less than 100 in lifetime), No, 11/11/2021  Family History  Hypertension.: Father.  Primary malignant neoplasm of prostate: Father.  Immunizations  Vaccine Date Status   influenza virus vaccine, inactivated 10/13/2021 Given   influenza virus vaccine, inactivated 02/04/2021 Given   influenza virus vaccine, inactivated 11/25/2019 Given   tetanus/diphtheria/pertussis, acel(Tdap) 08/26/2019 Given   influenza virus vaccine, inactivated 11/12/2018 Given   influenza virus vaccine, inactivated 11/28/2017 Given       I have seen and examined the patient with the resident at the time of the appointment. The chart was reviewed. I agree with the assessment and plan. Care provided was reasonable and necessary.

## 2022-05-03 NOTE — HISTORICAL [facility] CERN[facility]
This is a historical note converted from Mike. Formatting and pictures may have been removed.  Please reference Mike for original formatting and attached multimedia. Chief Complaint  F/U rectal bleeding  History of Present Illness  Mr. Whiting is a 34 yo M w/ PMHx of Hypothyroidism, Vitamin D Def and Class II Obesity who presents for f/u of BRBPR s/p diagnosis of external hemorrhoid in ED.? Recently seen in [facility] on 10/5/2021.  ?  BRBPR  External Hemorrhoids  Microcytic Anemia:  Has had small-mod amount of non-painful?BRBPR with wiping only x approximately one month, which persists.?He began having sharp 8/10 pain on?Thursday w/ BM and sitting, so presented to ED and diagnosed w/ non-thrombosed external hemorrhoid and no anal fissure noted on exam. Rx Anusol and?bought OTC cooling gel w/ quick?relief of pain. + intermittent hard stool and constipation w/ low fiber diet, poor water intake?& prolonged toilet time, but improved w/ OTC laxative since Friday.?Denies CP, SOB, light-headedness/dizziness. ?Denies abdominal pain or n/v.?+ mild chronic fatigue. Denies personal or fam hx of GI malignancy or inflammatory disease. + Father w/ prostate cancer. Pt had never been diagnosed w/ hemorrhoids, anal fissure?or had rectal bleeding before. Has never had a Colonoscopy.  ?  Hypothyroidism:  + constipation as noted above?and mild chronic?fatigue, but denies any other symptoms. Recently increased Synthroid to 175 mcg from 150 mcg after 10/5 appt.  ?  Review of Systems  Constitutional:?no fever, + fatigue, no?weakness  Eye:?no vision loss, eye redness, drainage, or pain  ENMT:?no sore throat, ear pain, sinus pain/congestion, nasal congestion/drainage  Respiratory:?no cough, no wheezing, no shortness of breath  Cardiovascular:?no chest pain, no palpitations, no edema  Gastrointestinal:?no nausea, vomiting, or diarrhea. No abdominal pain  Genitourinary:?no dysuria, no urinary frequency or urgency, no hematuria  Hema/Lymph:?no  abnormal bruising or bleeding  Endocrine:?no heat or cold intolerance, no excessive thirst or excessive urination  Musculoskeletal:?no muscle or joint pain, no joint swelling  Integumentary:?no skin rash or abnormal lesion  Neurologic: no headache, no dizziness, no weakness or numbness  ?  Physical Exam  Vitals & Measurements  T:?36.7? ?C (Oral)? HR:?70(Peripheral)? RR:?20? BP:?136/88? SpO2:?98%?  HT:?160.00?cm? WT:?97.300?kg? BMI:?38.01?  General:?well developed; well nourished; cooperative  Neck: no abnormalities noted  Eyes: no injection in the conjunctiva, no lid lag noted  PSYCH: alert and oriented with?appropriate mood and affect  SKIN: inspection and palpation of skin and soft tissue normal; no scars noted on upper/lower extremities  CV: vascular integrity noted; +2 symmetrical pulses, no edema  Respiratory: no increased respiratory effort noted  GI: S/NT/ND/+ Normoactive BS  Anal: Cluster of external hemorrhoid noted on R?anterior superior quadrant, non-thrombosed. No anal fissure noted.?No gross blood noted. Good anal tone. No masses palpated.  NEURO: sensation intact by light touch  Assessment/Plan  1.?Rectal bleeding?K62.5  ?-Likely 2/2 external hemorrhoid per hx and physical exam. However, CTM symptomatically and escalate care per PCP as indicated.  -RTC/ER precautions given in setting of continued bleeding and symptomatic anemia  2.?External hemorrhoid?K64.4  ?-Continue Anusol and OTC cooling gel as rx.  -Recommend PRN Laxative, good H20 intake, High Fiber Diet and, good toilet hygiene to reduce risk of constipation.  -RTC/ER precautions as above.  3.?Microcytic anemia?D50.9  ?-Chronic and pre-dates current episode of rectal bleeding. Ordered Iron Profile and Ferritin.  4.?Primary hypothyroidism?E03.9  -Will repeat TSH/T4,?TSH approx 7 in?3/2021.  -Continue Synthroid as rx.  Orders:  Ferritin, Routine collect, 10/14/21 5:00:00 CDT, Blood, Order for future visit, Stop date 10/14/21 5:00:00 CDT, Lab  Collect, Anemia, 10/14/21 5:00:00 CDT  Iron Binding Capacity Total - Iron Profile, Routine collect, 10/14/21 5:00:00 CDT, Blood, Order for future visit, Stop date 10/14/21 5:00:00 CDT, Lab Collect, Anemia, 10/14/21 5:00:00 CDT  Referrals  Maintain upcoming appointment with [facility] on 11/11/2021.   Problem List/Past Medical History  Ongoing  Obesity  Primary hypothyroidism  Prolactin increased  SCFE (slipped capital femoral epiphysis)  Vitamin D deficiency  Historical  No qualifying data  Procedure/Surgical History  Hip joint closure (2017)  denies   Medications  influenza virus vaccine, inactivated quadrivalent intramuscular suspension, 0.5 mL, IM, Once-Unscheduled  levothyroxine 175 mcg (0.175 mg) oral capsule, 175 mcg= 1 cap(s), Oral, Daily, 1 refills  Vitamin D3 oral tablet 400 intl units, 800 IntUnit= 2 tab(s), Oral, Daily, 1 refills  Allergies  HYDROcodone?(nausea)  Social History  Abuse/Neglect  No, No, Yes, 10/13/2021  Alcohol  Never, 12/10/2019  Never, 11/13/2017  Employment/School  Part time, Activity level: Occasional physical work. Highest education level: High school. Workplace hazards: Heavy lifting/twisting., 05/15/2018  Exercise  Exercise duration: 60. Exercise frequency: 5-6 times/week. Exercise type: Walking, Weight lifting., 12/04/2018  Home/Environment  Lives with Father, Mother. Living situation: Home/Independent., 12/04/2018  Nutrition/Health  Regular, 05/15/2018  Sexual  Sexually active: No. Sexual orientation: Straight or heterosexual. History of sexual abuse: No. Gender Identity Identifies as male., 03/26/2019  Spiritual/Cultural  Hoahaoism, 06/25/2019  Substance Use  Never, 11/12/2018  Tobacco  Never (less than 100 in lifetime), N/A, 10/13/2021  Family History  Hypertension.: Father.  Primary malignant neoplasm of prostate: Father.  Immunizations  Vaccine Date Status   influenza virus vaccine, inactivated 02/04/2021 Given   influenza virus vaccine, inactivated 11/25/2019 Given    tetanus/diphtheria/pertussis, acel(Tdap) 08/26/2019 Given   influenza virus vaccine, inactivated 11/12/2018 Given   influenza virus vaccine, inactivated 11/28/2017 Given   Health Maintenance  Health Maintenance  ???Pending?(in the next year)  ???There are no current recommendations pending  ??? ??Due In Future?  ??? ? ? ?Obesity Screening not due until??01/01/22??and every 1??year(s)  ??? ? ? ?Alcohol Misuse Screening not due until??01/02/22??and every 1??year(s)  ??? ? ? ?ADL Screening not due until??02/04/22??and every 1??year(s)  ??? ? ? ?Depression Screening not due until??10/05/22??and every 1??year(s)  ??? ? ? ?Hypertension Management-BMP not due until??10/08/22??and every 1??year(s)  ???Satisfied?(in the past 1 year)  ??? ??Satisfied?  ??? ? ? ?ADL Screening on??02/04/21.??Satisfied by Lolita Amaya LPN.  ??? ? ? ?Alcohol Misuse Screening on??02/04/21.??Satisfied by Lolita Amaya LPN.  ??? ? ? ?Blood Pressure Screening on??10/13/21.??Satisfied by ESDRAS Garcia Tanessa  ??? ? ? ?Body Mass Index Check on??10/13/21.??Satisfied by ESDRAS Garcia Tanessa  ??? ? ? ?Depression Screening on??10/05/21.??Satisfied by Nadeem Mckeon LPN  ??? ? ? ?Diabetes Screening on??10/08/21.??Satisfied by Lacey Herrera  ??? ? ? ?Hypertension Management-Blood Pressure on??10/13/21.??Satisfied by ESDRAS Garcia Tanessa  ??? ? ? ?Influenza Vaccine on??10/13/21.??Satisfied by Parul Degroot MD  ??? ? ? ?Obesity Screening on??10/13/21.??Satisfied by ESDRAS Garcia Tanessa  ?  Lab Results  No new lab results.  Diagnostic Results  No new diagnostic results.      Patient discussed with resident on the DOS. I have read the residents HPI, PE and plan  The assessment and plan are reasonable and appropriate.

## 2022-05-03 NOTE — HISTORICAL [facility]
This is a historical note converted from [employer]. Formatting and pictures may have been removed.  Please reference [employer] for original formatting and attached multimedia. Chief Complaint  followup on hypothyroid.  History of Present Illness  Leonid Whiting is a 35 yr old male with PMH of hypothyroidism and?Vitamin D deficiency. He was last seen on 6/11/20.  ?  Acute issues:?  Only complains of acid reflux, says he uses Tums for relief.  ?  Chronic issues:  Hypothyroidism: Currently taking levothyroxine 125 mcg, tries to take it daily. Denies constipation. He does feel fatigue and has noticed weight gain.  ?   Vitamin D deficiency: Currently taking Vitamin D3 daily, also tries to take it daily when he remembers.  ?   Social: Denies tobacco, alcohol, illicit drug use. Working out multiple days/week. Said Covid slowed him down, but is trying to get back to a regular schedule.  ?   He is being followed by Endocrinology clinic.  Review of Systems  Constitutional: no fever, no chills, no weight loss, + weight gain  CV: no swelling, no edema?  GI: no fecal incontinence, no hematochezia  : no urinary retention, no urinary incontinence, no hematuria  Skin: no rash, no wound  Neuro: no numbness/tingling, no weakness  MSK: no joint pain, no muscle weakness  Psych: no depression, no anxiety  Heme/Lymph: no easy bruising, no easy bleeding  Immuno: no allergic reactions, no recurrent infections  Physical Exam  Vitals & Measurements  T:?36.9? ?C (Oral)? HR:?76(Peripheral)? RR:?20? BP:?123/78? SpO2:?100%?  HT:?160.00?cm? WT:?98.400?kg? BMI:?38.44?  General:?The patient is pleasant and cooperative and in no acute distress  Eyes:?Pupils are equal, round, and reactive to light. Extraocular movements are intact and equal.  Mouth:?Palate is intact. Mucous membranes are moist.  Neck:?Supple.? No?lymphadenopathy is noted.  Chest:?Clear to auscultation with bilateral breath sounds. There is no wheezing, retractions, rhonchi, or  stridor.  Cardiovascular:?Regular rate and rhythm. +2 peripheral pulses.  Abdomen:?Soft, non-distended, non-tender, with positive bowel sounds.??  Extremities:?No clubbing, cyanosis, or edema.?  Neurological: Alert and oriented.  Skin:?The skin is warm to touch.??There is good turgor.??No rashes or other blemishes noted.  Assessment/Plan  1.?Hypothyroidism?E03.9,?Hypothyroidism?E03.9,?Hypothyroidism?E03.9,?Hypothyroidism?E03.9,?Hypothyroidism?E03.9,?Hypothyroidism?E03.9  -Last TSH was 19, done 6/11/20  -Ordered labs, will let patient know of lab results when they come back  -If TSH is elevated may consider increasing current Synthroid dose from 125mg to 137mg to receive better control, will await results.  Ordered:  Clinic Follow up, *Est. 04/26/21 14:00:00 CDT, Order for future visit, Hypothyroidism, [facility]  Free T4, Routine collect, 02/04/21 16:00:00 CST, Blood, Collected, Stop date 02/04/21 16:00:00 CST, Lab Collect, Venous Draw, Hypothyroidism, 02/04/21 15:36:00 CST  Prolactin, Routine collect, 02/04/21 16:02:00 CST, Blood, Stop date 02/04/21 16:02:00 CST, Lab Collect, Hypothyroidism, 02/04/21 16:02:00 CST  Testosterone Level Total, Routine collect, 02/04/21 16:02:00 CST, Blood, Stop date 02/04/21 16:02:00 CST, Lab Collect, Hypothyroidism, 02/04/21 16:02:00 CST  ?  2.?Vitamin D deficiency?E55.9  -Will check vitamin D level  -Currently on Vitamin d supplements.?  ?  3.?Acid reflux?K21.9  -Avoid foods and drinks that cause acid reflux like chocolate, tomato, spicy foods, and etc. Drink more water.  -Ordered Pepcid to help with acid reflux.  ?  Orders:  famotidine, 20 mg = 1 tab(s), Oral, BID, # 60 tab(s), 0 Refill(s), Pharmacy: [facility], 160, cm, Height/Length Dosing, 02/04/21 14:18:00 CST, 98.4, kg, Weight Dosing, 02/04/21 14:18:00 CST  Referrals  Clinic Follow up, *Est. 04/26/21 14:00:00 CDT, Order for future visit, Hypothyroidism, [facility]   Problem List/Past  Medical History  Ongoing  Obesity  Primary hypothyroidism  Prolactin increased  SCFE (slipped capital femoral epiphysis)  Vitamin D deficiency  Historical  No qualifying data  Procedure/Surgical History  Hip joint closure (2017)  denies   Medications  levothyroxine 125 mcg (0.125 mg) oral tablet, 125 mcg= 1 tab(s), Oral, Daily, 3 refills  Vitamin D3 oral tablet 400 intl units, 800 IntUnit= 2 tab(s), Oral, Daily, 1 refills  Allergies  HYDROcodone?(nausea)  Social History  Abuse/Neglect  No, No, 02/04/2021  Alcohol  Never, 12/10/2019  Never, 11/13/2017  Employment/School  Part time, Activity level: Occasional physical work. Highest education level: High school. Workplace hazards: Heavy lifting/twisting., 05/15/2018  Exercise  Exercise duration: 60. Exercise frequency: 5-6 times/week. Exercise type: Walking, Weight lifting., 12/04/2018  Home/Environment  Lives with Father, Mother. Living situation: Home/Independent., 12/04/2018  Nutrition/Health  Regular, 05/15/2018  Sexual  Sexually active: No. Sexual orientation: Straight or heterosexual. History of sexual abuse: No. Gender Identity Identifies as male., 03/26/2019  Spiritual/Cultural  Restoration, 06/25/2019  Substance Use  Never, 12/10/2019  Never, 11/12/2018  Tobacco  Never (less than 100 in lifetime), N/A, Household tobacco concerns: No. Smokeless Tobacco Use: Never., 02/04/2021  Family History  Hypertension.: Father.  Primary malignant neoplasm of prostate: Father.  Immunizations  Vaccine Date Status   influenza virus vaccine, inactivated 02/04/2021 Given   influenza virus vaccine, inactivated 11/25/2019 Given   tetanus/diphtheria/pertussis, acel(Tdap) 08/26/2019 Given   influenza virus vaccine, inactivated 11/12/2018 Given   influenza virus vaccine, inactivated 11/28/2017 Given   Health Maintenance  Health Maintenance  ???Pending?(in the next year)  ???There are no current recommendations pending  ??? ??Due In Future?  ??? ? ? ?Hypertension Management-BMP not due  until??06/11/21??and every 1??year(s)  ??? ? ? ?Influenza Vaccine not due until??10/01/21??and every 1??day(s)  ??? ? ? ?Obesity Screening not due until??01/01/22??and every 1??year(s)  ??? ? ? ?Alcohol Misuse Screening not due until??01/02/22??and every 1??year(s)  ???Satisfied?(in the past 1 year)  ??? ??Satisfied?  ??? ? ? ?ADL Screening on??02/04/21.??Satisfied by Monique LPN, Lolita A.  ??? ? ? ?Alcohol Misuse Screening on??02/04/21.??Satisfied by Monique LPN, Lolita A.  ??? ? ? ?Blood Pressure Screening on??02/04/21.??Satisfied by Monique LPN, Lolita A.  ??? ? ? ?Body Mass Index Check on??02/04/21.??Satisfied by Monique LPN, Lolita A.  ??? ? ? ?Depression Screening on??02/04/21.??Satisfied by Monique LPN, Lolita A.  ??? ? ? ?Diabetes Screening on??06/11/20.??Satisfied by Roselia Evans  ??? ? ? ?Hypertension Management-Blood Pressure on??02/04/21.??Satisfied by Monique LPN, Lolita A.  ??? ? ? ?Influenza Vaccine on??02/04/21.??Satisfied by Monique LPN, Lolita A.  ??? ? ? ?Obesity Screening on??02/04/21.??Satisfied by Monique FRANCHESKAN, Lolita A.  ?      ?  I reviewed History, PE, A/P and chart was reviewed.  I agree with resident, care reasonable and appropriate.?

## 2022-05-04 ENCOUNTER — ANESTHESIA EVENT (OUTPATIENT)
Dept: ENDOSCOPY | Facility: HOSPITAL | Age: 37
End: 2022-05-04
Payer: MEDICAID

## 2022-05-04 ENCOUNTER — ANESTHESIA (OUTPATIENT)
Dept: ENDOSCOPY | Facility: HOSPITAL | Age: 37
End: 2022-05-04
Payer: MEDICAID

## 2022-05-04 ENCOUNTER — HOSPITAL ENCOUNTER (OUTPATIENT)
Facility: HOSPITAL | Age: 37
Discharge: HOME | End: 2022-05-04
Attending: INTERNAL MEDICINE | Admitting: INTERNAL MEDICINE
Payer: MEDICAID

## 2022-05-04 DIAGNOSIS — Z01.818 PRE-OP TESTING: ICD-10-CM

## 2022-05-04 DIAGNOSIS — D50.9 IRON DEFICIENCY ANEMIA, UNSPECIFIED IRON DEFICIENCY ANEMIA TYPE: ICD-10-CM

## 2022-05-04 DIAGNOSIS — K64.8 HEMORRHOIDS, INTERNAL, WITH BLEEDING: Primary | ICD-10-CM

## 2022-05-04 LAB
CTP QC/QA: YES
SARS-COV-2 AG RESP QL IA.RAPID: NEGATIVE

## 2022-05-04 PROCEDURE — 00811 ANES LWR INTST NDSC NOS: CPT | Performed by: INTERNAL MEDICINE

## 2022-05-04 PROCEDURE — 63600175 PHARM REV CODE 636 W HCPCS

## 2022-05-04 PROCEDURE — 37000008 HC ANESTHESIA 1ST 15 MINUTES: Performed by: INTERNAL MEDICINE

## 2022-05-04 PROCEDURE — 63600175 PHARM REV CODE 636 W HCPCS: Performed by: ANESTHESIOLOGY

## 2022-05-04 PROCEDURE — 37000009 HC ANESTHESIA EA ADD 15 MINS: Performed by: INTERNAL MEDICINE

## 2022-05-04 PROCEDURE — 25000003 PHARM REV CODE 250: Performed by: NURSE ANESTHETIST, CERTIFIED REGISTERED

## 2022-05-04 PROCEDURE — 87635 SARS-COV-2 COVID-19 AMP PRB: CPT | Performed by: ANESTHESIOLOGY

## 2022-05-04 PROCEDURE — 45378 DIAGNOSTIC COLONOSCOPY: CPT | Performed by: INTERNAL MEDICINE

## 2022-05-04 PROCEDURE — 63600175 PHARM REV CODE 636 W HCPCS: Performed by: NURSE ANESTHETIST, CERTIFIED REGISTERED

## 2022-05-04 RX ORDER — LIDOCAINE HYDROCHLORIDE 10 MG/ML
1 INJECTION, SOLUTION EPIDURAL; INFILTRATION; INTRACAUDAL; PERINEURAL ONCE
Status: CANCELLED | OUTPATIENT
Start: 2022-05-04 | End: 2022-05-04

## 2022-05-04 RX ORDER — FERROUS GLUCONATE 324(38)MG
324 TABLET ORAL DAILY
COMMUNITY
End: 2022-08-26

## 2022-05-04 RX ORDER — LIDOCAINE HYDROCHLORIDE 20 MG/ML
INJECTION, SOLUTION EPIDURAL; INFILTRATION; INTRACAUDAL; PERINEURAL
Status: DISCONTINUED | OUTPATIENT
Start: 2022-05-04 | End: 2022-05-04

## 2022-05-04 RX ORDER — PROPOFOL 10 MG/ML
VIAL (ML) INTRAVENOUS
Status: DISCONTINUED | OUTPATIENT
Start: 2022-05-04 | End: 2022-05-04

## 2022-05-04 RX ORDER — SODIUM CHLORIDE, SODIUM LACTATE, POTASSIUM CHLORIDE, CALCIUM CHLORIDE 600; 310; 30; 20 MG/100ML; MG/100ML; MG/100ML; MG/100ML
INJECTION, SOLUTION INTRAVENOUS CONTINUOUS
Status: DISCONTINUED | OUTPATIENT
Start: 2022-05-04 | End: 2022-05-04 | Stop reason: HOSPADM

## 2022-05-04 RX ADMIN — PROPOFOL 40 MG: 10 INJECTION, EMULSION INTRAVENOUS at 09:05

## 2022-05-04 RX ADMIN — PROPOFOL 20 MG: 10 INJECTION, EMULSION INTRAVENOUS at 09:05

## 2022-05-04 RX ADMIN — LIDOCAINE HYDROCHLORIDE 100 MG: 20 INJECTION, SOLUTION EPIDURAL; INFILTRATION; INTRACAUDAL; PERINEURAL at 09:05

## 2022-05-04 RX ADMIN — PROPOFOL 100 MG: 10 INJECTION, EMULSION INTRAVENOUS at 09:05

## 2022-05-04 RX ADMIN — SODIUM CHLORIDE, POTASSIUM CHLORIDE, SODIUM LACTATE AND CALCIUM CHLORIDE: 600; 310; 30; 20 INJECTION, SOLUTION INTRAVENOUS at 09:05

## 2022-05-04 RX ADMIN — SODIUM CHLORIDE, POTASSIUM CHLORIDE, SODIUM LACTATE AND CALCIUM CHLORIDE: 600; 310; 30; 20 INJECTION, SOLUTION INTRAVENOUS at 08:05

## 2022-05-04 NOTE — TRANSFER OF CARE
"Anesthesia Transfer of Care Note    Patient: Leonid Whiting III    Procedure(s) Performed: Procedure(s) (LRB):  COLONOSCOPY (N/A)    Patient location: GI    Anesthesia Type: general    Transport from OR: Transported from OR on room air with adequate spontaneous ventilation    Post pain: adequate analgesia    Post assessment: no apparent anesthetic complications    Post vital signs: stable    Level of consciousness: awake and sedated    Nausea/Vomiting: no nausea/vomiting    Complications: none    Transfer of care protocol was followed      Last vitals:   Visit Vitals  BP (!) 140/95   Pulse 60   Temp 36.7 °C (98.1 °F)   Resp 20   Ht 5' 2" (1.575 m)   Wt 98 kg (216 lb 0.8 oz)   SpO2 100%   BMI 39.52 kg/m²     "

## 2022-05-04 NOTE — ANESTHESIA POSTPROCEDURE EVALUATION
Anesthesia Post Evaluation    Patient: Leonid Whiting III    Procedure(s) Performed: Procedure(s) (LRB):  COLONOSCOPY (N/A)    Final Anesthesia Type: general      Patient location during evaluation: GI PACU  Patient participation: Yes- Able to Participate  Level of consciousness: awake and alert and sedated  Post-procedure vital signs: reviewed and stable  Pain management: adequate  Airway patency: patent    PONV status at discharge: No PONV  Anesthetic complications: no      Cardiovascular status: blood pressure returned to baseline and hemodynamically stable  Respiratory status: unassisted and room air  Hydration status: euvolemic  Follow-up not needed.          Vitals Value Taken Time   /95 05/04/22 0723   Temp 36.7 °C (98.1 °F) 05/04/22 0722   Pulse 60 05/04/22 0722   Resp 20 05/04/22 0722   SpO2 100 % 05/04/22 0722         No case tracking events are documented in the log.      Pain/Tash Score: No data recorded

## 2022-05-04 NOTE — [employer] PATIENT INSTRUCTIONS
Discharge Summary/Instructions after an Endoscopic Procedure  Patient Name: Leonid Whiting  Patient MRN: [id number]  Patient YOB: 1985  Wednesday, May 4, 2022  Nel Galarza MD  Dear patient,  As a result of recent federal legislation (The Federal Cures Act), you may   receive lab or pathology results from your procedure in your [facility]   account before your physician is able to contact you. Your physician or   their representative will relay the results to you with their   recommendations at their soonest availability.  Thank you,  RESTRICTIONS:  During your procedure today, you received medications for sedation.  These   medications may affect your judgment, balance and coordination.  Therefore,   for 24 hours, you have the following restrictions:   - DO NOT drive a car, operate machinery, make legal/financial decisions,   sign important papers or drink alcohol.    ACTIVITY:  Today: no heavy lifting, straining or running due to procedural   sedation/anesthesia.  The following day: return to full activity including work.  DIET:  Eat and drink normally unless instructed otherwise.     TREATMENT FOR COMMON SIDE EFFECTS:  - Mild abdominal pain, nausea, belching, bloating or excessive gas:  rest,   eat lightly and use a heating pad.  - Sore Throat: treat with throat lozenges and/or gargle with warm salt   water.  - Because air was used during the procedure, expelling large amounts of air   from your rectum or belching is normal.  - If a bowel prep was taken, you may not have a bowel movement for 1-3 days.    This is normal.  SYMPTOMS TO WATCH FOR AND REPORT TO YOUR PHYSICIAN:  1. Abdominal pain or bloating, other than gas cramps.  2. Chest pain.  3. Back pain.  4. Signs of infection such as: chills or fever occurring within 24 hours   after the procedure.  5. Rectal bleeding, which would show as bright red, maroon, or black stools.   (A tablespoon of blood from the rectum is not serious, especially  if   hemorrhoids are present.)  6. Vomiting.  7. Weakness or dizziness.  GO DIRECTLY TO THE NEAREST EMERGENCY ROOM IF YOU HAVE ANY OF THE FOLLOWING:      Difficulty breathing              Chills and/or fever over 101 F   Persistent vomiting and/or vomiting blood   Severe abdominal pain   Severe chest pain   Black, tarry stools   Bleeding- more than one tablespoon   Any other symptom or condition that you feel may need urgent attention  Your doctor recommends these additional instructions:  If any biopsies were taken, your doctors clinic will contact you in 1 to 2   weeks with any results.  - Patient has a contact number available for emergencies.  The signs and   symptoms of potential delayed complications were discussed with the   patient.  Return to normal activities tomorrow.  Written discharge   instructions were provided to the patient.   - Resume previous diet.   - Continue present medications.   - Repeat colonoscopy in 10 years for screening purposes.   - Use fiber, for example Citrucel, Fibercon, Konsyl or Metamucil.   - Discharge patient to home.  For questions, problems or results please call your physician - Nel Galarza MD at Work:  [phone].  [facility] , EMERGENCY ROOM PHONE NUMBER: [phone]  IF A COMPLICATION OR EMERGENCY SITUATION ARISES AND YOU ARE UNABLE TO REACH   YOUR PHYSICIAN - GO DIRECTLY TO THE EMERGENCY ROOM.  MD Nel Butler MD  5/4/2022 9:44:04 AM  This report has been verified and signed electronically.  Dear patient,  As a result of recent federal legislation (The Federal Cures Act), you may   receive lab or pathology results from your procedure in your [facility]   account before your physician is able to contact you. Your physician or   their representative will relay the results to you with their   recommendations at their soonest availability.  Thank you,  PROVATION

## 2022-05-04 NOTE — H&P
Colonoscopy History and Physical    Patient Name: Leonid Whiting III  MRN: [id number]  : 1985  Date of Procedure:  2022  Referring Physician: Nel Galarza MD  Primary Physician: Primary Doctor No  Procedure Physician: Nel Galarza MD, MPH     Procedure - Colonoscopy  ASA - per anesthesia  Mallampati - per anesthesia  History of Anesthesia problems - no  Family history Anesthesia problems -  no   Plan of anesthesia - General    Diagnosis: rectal bleeding, MINGO  Chief Complaint: Same as above    HPI: Patient is an 36 y.o. male is here for the above.     Mr. Whiting is a 36-year-old  male with hypothyroidism, obesity here for MINGO and colonoscopy.  Stool for occult blood was negative 2021. He presented to the ED at [facility] 2021 for rectal bleeding and rectal pain. Physical exam revealed external hemorrhoid. Hemoglobin 12.4, MCV 72.7.  He was discharged home and prescribed hemorrhoidal cream. Laboratory results 2021 revealed iron level 28, saturation 7%, ferritin 5.72.     His rectal pain and bleeding has since resolved.  He reports regular bowel movements on a daily basis.  He denies significant constipation.  He does sit on the toilet for an extended period of time.  He denies any significant straining or hard stools.  He denies any abdominal pain.  He gets occasional heartburn, usually with trigger foods, for which he takes Tums p.r.n..    He denies ever having an EGD or colonoscopy done. He denies regular NSAID use or use of blood thinners. He denies tobacco or alcohol use. He denies a family history of IBD, colon polyps, or colon cancer. His father has a history of prostate cancer.      Last colonoscopy: none  Family history: denies  Anticoagulation: none    ROS:  Constitutional: No fevers, chills, No weight loss  CV: No chest pain  Pulm: No cough, No shortness of breath  GI: see HPI    Medical History:   Past Medical History:   Diagnosis Date     "Hypothyroidism, unspecified     Microcytic anemia     Obesity, unspecified     Vitamin D deficiency          Surgical History:   Past Surgical History:   Procedure Laterality Date    hip joint closure  2017       Family History:   Family History   Problem Relation Age of Onset    Hypertension Father     Prostate cancer Father    .    Social History:   Social History     Socioeconomic History    Marital status: Single   Tobacco Use    Smoking status: Never Smoker    Smokeless tobacco: Never Used   Substance and Sexual Activity    Alcohol use: Not Currently     Comment: "1-2x/month"    Drug use: Not Currently    Sexual activity: Not Currently       Review of patient's allergies indicates:   Allergen Reactions    Hydrocodone Nausea Only       Medications:   Medications Prior to Admission   Medication Sig Dispense Refill Last Dose    levothyroxine (SYNTHROID, LEVOTHROID) 175 MCG tablet Take 175 mcg by mouth before breakfast.   Taking    multivitamin with minerals tablet Take 1 tablet by mouth once daily.   5/4/2022 at Unknown time    docusate calcium (SURFAK) 240 mg capsule Take 240 mg by mouth once daily.       ferrous gluconate (FERGON) 324 MG tablet Take 324 mg by mouth once daily at 6am.       polyethylene glycol (GLYCOLAX) 17 gram PwPk Take 17 g by mouth once daily.        Prior to Admission medications    Medication Sig Start Date End Date Taking? Authorizing Provider   levothyroxine (SYNTHROID, LEVOTHROID) 175 MCG tablet Take 175 mcg by mouth before breakfast.   Yes Historical Provider   multivitamin with minerals tablet Take 1 tablet by mouth once daily.   Yes Historical Provider   docusate calcium (SURFAK) 240 mg capsule Take 240 mg by mouth once daily.    Historical Provider   ferrous gluconate (FERGON) 324 MG tablet Take 324 mg by mouth once daily at 6am.    Historical Provider   polyethylene glycol (GLYCOLAX) 17 gram PwPk Take 17 g by mouth once daily.    Historical Provider        Physical " "Exam:    Vital Signs:   Vitals:    05/04/22 0723   BP: (!) 140/95   Pulse:    Resp:    Temp:      BP (!) 140/95   Pulse 60   Temp 98.1 °F (36.7 °C)   Resp 20   Ht 5' 2" (1.575 m)   Wt 98 kg (216 lb 0.8 oz)   SpO2 100%   BMI 39.52 kg/m²     General:          Well appearing in no acute distress  Lungs: Clear to auscultation bilaterally, respirations unlabored  Heart: Regular rate and rhythm, S1 and S2 normal, no obvious murmurs  Abdomen:         Soft, non-tender, bowel sounds normal, no masses, no organomegaly        Labs:  Lab Results   Component Value Date    WBC 8.4 10/08/2021    HGB 12.4 (L) 10/08/2021    HCT 41.6 10/08/2021    MCV 72.7 (L) 10/08/2021    PLATELET 282 10/08/2021     No results found for: INR, PT, APTT  Lab Results   Component Value Date    BUN 9.5 10/08/2021    CREATININE 0.75 10/08/2021    SODIUM 138 10/08/2021    POTASSIUM 4.3 10/08/2021    CO2 24 10/08/2021     Iron: 28  Iron sat: 7  Ferritin: 5.7    Assessment and Plan:    History reviewed, vital signs satisfactory, cardiopulmonary status satisfactory.  I have explained the sedation options, risks, benefits, and alternatives of this endoscopic procedure to the patient including but not limited to bleeding, inflammation, infection, perforation, and death.  All questions were answered and the patient consented to proceed with procedure as planned.   The patient is deemed an appropriate candidate for the sedation as planned.      Nel Galarza MD, MPH   of Clinical Medicine  Gastroenterology and Hepatology  [facility]    5/4/2022  8:15 AM     "

## 2022-05-05 VITALS
TEMPERATURE: 98 F | SYSTOLIC BLOOD PRESSURE: 138 MMHG | WEIGHT: 216.06 LBS | HEART RATE: 59 BPM | OXYGEN SATURATION: 99 % | BODY MASS INDEX: 39.76 KG/M2 | HEIGHT: 62 IN | DIASTOLIC BLOOD PRESSURE: 94 MMHG | RESPIRATION RATE: 20 BRPM

## 2022-05-08 LAB — SARS-COV-2 RNA RESP QL NAA+PROBE: NOT DETECTED

## 2022-05-13 RX ORDER — LEVOTHYROXINE SODIUM 175 UG/1
175 TABLET ORAL
Qty: 30 TABLET | Refills: 3 | Status: SHIPPED | OUTPATIENT
Start: 2022-05-13 | End: 2022-09-24

## 2022-06-28 ENCOUNTER — OFFICE VISIT (OUTPATIENT)
Dept: GASTROENTEROLOGY | Facility: CLINIC | Age: 37
End: 2022-06-28
Payer: COMMERCIAL

## 2022-06-28 VITALS
WEIGHT: 228.63 LBS | TEMPERATURE: 98 F | SYSTOLIC BLOOD PRESSURE: 128 MMHG | HEIGHT: 63 IN | HEART RATE: 77 BPM | RESPIRATION RATE: 18 BRPM | DIASTOLIC BLOOD PRESSURE: 88 MMHG | BODY MASS INDEX: 40.51 KG/M2

## 2022-06-28 DIAGNOSIS — K64.8 HEMORRHOIDS, INTERNAL, WITH BLEEDING: ICD-10-CM

## 2022-06-28 DIAGNOSIS — D50.0 IRON DEFICIENCY ANEMIA DUE TO CHRONIC BLOOD LOSS: Primary | ICD-10-CM

## 2022-06-28 LAB
BASOPHILS # BLD AUTO: 0.07 X10(3)/MCL (ref 0–0.2)
BASOPHILS NFR BLD AUTO: 0.9 %
EOSINOPHIL # BLD AUTO: 0.44 X10(3)/MCL (ref 0–0.9)
EOSINOPHIL NFR BLD AUTO: 5.8 %
ERYTHROCYTE [DISTWIDTH] IN BLOOD BY AUTOMATED COUNT: 13.1 % (ref 11.5–17)
FERRITIN SERPL-MCNC: 23.23 NG/ML (ref 21.81–274.66)
HCT VFR BLD AUTO: 52.5 % (ref 42–52)
HGB BLD-MCNC: 17.3 GM/DL (ref 14–18)
IMM GRANULOCYTES # BLD AUTO: 0.04 X10(3)/MCL (ref 0–0.02)
IMM GRANULOCYTES NFR BLD AUTO: 0.5 % (ref 0–0.43)
IRON SATN MFR SERPL: 77 % (ref 20–50)
IRON SERPL-MCNC: 299 UG/DL (ref 65–175)
LYMPHOCYTES # BLD AUTO: 2.35 X10(3)/MCL (ref 0.6–4.6)
LYMPHOCYTES NFR BLD AUTO: 31.1 %
MCH RBC QN AUTO: 29.4 PG (ref 27–31)
MCHC RBC AUTO-ENTMCNC: 33 MG/DL (ref 33–36)
MCV RBC AUTO: 89.3 FL (ref 80–94)
MONOCYTES # BLD AUTO: 0.77 X10(3)/MCL (ref 0.1–1.3)
MONOCYTES NFR BLD AUTO: 10.2 %
NEUTROPHILS # BLD AUTO: 3.9 X10(3)/MCL (ref 2.1–9.2)
NEUTROPHILS NFR BLD AUTO: 51.5 %
NRBC BLD AUTO-RTO: 0 %
PLATELET # BLD AUTO: 246 X10(3)/MCL (ref 130–400)
PMV BLD AUTO: 11.9 FL (ref 9.4–12.4)
RBC # BLD AUTO: 5.88 X10(6)/MCL (ref 4.7–6.1)
TIBC SERPL-MCNC: 389 UG/DL (ref 250–450)
TIBC SERPL-MCNC: 90 UG/DL (ref 69–240)
TRANSFERRIN SERPL-MCNC: 302 MG/DL (ref 174–364)
WBC # SPEC AUTO: 7.6 X10(3)/MCL (ref 4.5–11.5)

## 2022-06-28 PROCEDURE — 99214 OFFICE O/P EST MOD 30 MIN: CPT | Mod: PBBFAC | Performed by: INTERNAL MEDICINE

## 2022-06-28 PROCEDURE — 36415 COLL VENOUS BLD VENIPUNCTURE: CPT | Performed by: INTERNAL MEDICINE

## 2022-06-28 PROCEDURE — 85025 COMPLETE CBC W/AUTO DIFF WBC: CPT | Performed by: INTERNAL MEDICINE

## 2022-06-28 PROCEDURE — 83540 ASSAY OF IRON: CPT | Performed by: INTERNAL MEDICINE

## 2022-06-28 PROCEDURE — 82728 ASSAY OF FERRITIN: CPT | Performed by: INTERNAL MEDICINE

## 2022-06-28 RX ORDER — HYDROCORTISONE 25 MG/G
CREAM TOPICAL
COMMUNITY
Start: 2021-12-17 | End: 2023-05-30

## 2022-06-28 NOTE — LETTER
June 28, 2022      [facility]  [address] [address]  Phone: [phone]       Patient: Leonid Whiting   YOB: 1985  Date of Visit: 06/28/2022    To Whom It May Concern:    Karson Whiting  was at [facility] on 06/28/2022. The patient may return to work/school on 06/28/2022 with no restrictions. If you have any questions or concerns, or if I can be of further assistance, please do not hesitate to contact me.    Sincerely,    Tanvi Palmer MA

## 2022-06-28 NOTE — ASSESSMENT & PLAN NOTE
October 2021: Hgb 12.4 g/dL   November 2021: Iron 28, iron sat 7%, ferritin 5.72  Not compliant with oral iron supplementation  Recheck CBC, iron panel, ferritin today

## 2022-06-28 NOTE — PROGRESS NOTES
Gastroenterology and Hepatology        Established Patient Note         TODAY'S VISIT DATE:  6/28/2022  The patient's last visit with me was on Visit date not found.     PCP: Primary Doctor No      Referring MD:   No ref. provider found    History of Present Illness:    Mr. Whiting is a 36-year-old  male with hypothyroidism, obesity, MINGO here for follow-up.       He presented to the ED at [facility] October 8, 2021 for rectal bleeding and rectal pain. Physical exam revealed external hemorrhoid. Hemoglobin 12.4, MCV 72.7.  He was discharged home and prescribed hemorrhoidal cream. Laboratory results on November 11, 2021 revealed iron level 28, saturation 7%, ferritin 5.72.      His rectal pain and bleeding has since resolved.  He reports regular bowel movements on a daily basis.  He denies significant constipation.  He occasionally has to strain.  He does not sit on the toilet for an extended period time. He denies any hard stools.  He denies any abdominal pain.  He gets occasional heartburn, usually with trigger foods, for which he takes Tums p.r.n.    He had a colonoscopy on May 4, 2022, which showed normal TI, small external and internal hemorrhoids, o/w normal colon.     He is prescribed ferrous gluconate 324mg daily but really has only been taking it every few days since it was prescribed.     He denies ever having an EGD. He denies regular NSAID use or use of blood thinners. He denies tobacco or alcohol use. He denies a family history of IBD, colon polyps, or colon cancer. His father has a history of prostate cancer.      Review of Systems   Constitutional: Negative for appetite change and unexpected weight change.   HENT: Negative for trouble swallowing.    Respiratory: Negative.    Cardiovascular: Negative.    Gastrointestinal: Negative for abdominal pain, blood in stool, change in bowel habit, constipation, diarrhea and change in bowel habit.   Musculoskeletal: Negative.   "  Neurological: Negative.    Hematological: Negative.    Psychiatric/Behavioral: Negative.    All other systems reviewed and are negative.         Medical/Surgical History:   Past Medical History:   Diagnosis Date    Hypothyroidism, unspecified     Iron deficiency anemia 5/4/2022    Microcytic anemia     Obesity, unspecified     Vitamin D deficiency      Past Surgical History:   Procedure Laterality Date    COLONOSCOPY N/A 5/4/2022    Procedure: COLONOSCOPY;  Surgeon: Nel Galarza MD;  Location: [facility] ENDOSCOPY;  Service: Gastroenterology;  Laterality: N/A;    hip joint closure  2017         Family History:   Family History   Problem Relation Age of Onset    Hypertension Father     Prostate cancer Father         Social History:   Social History     Socioeconomic History    Marital status: Single   Tobacco Use    Smoking status: Never Smoker    Smokeless tobacco: Never Used   Substance and Sexual Activity    Alcohol use: Not Currently     Comment: "1-2x/month"    Drug use: Not Currently    Sexual activity: Not Currently        Review of patient's allergies indicates:   Allergen Reactions    Hydrocodone Nausea Only       Current Medications:   Outpatient Medications Marked as Taking for the 6/28/22 encounter (Office Visit) with Nel Galarza MD   Medication Sig Dispense Refill    hydrocortisone 2.5 % cream Apply topically.          Vital Signs:  /88 (BP Location: Left arm, Patient Position: Sitting, BP Method: Medium (Automatic))   Pulse 77   Temp 98.2 °F (36.8 °C)   Resp 18   Ht 5' 3" (1.6 m)   Wt 103.7 kg (228 lb 9.6 oz)   BMI 40.49 kg/m²      Physical Exam  Vitals reviewed.   Constitutional:       Appearance: Normal appearance. He is obese.   HENT:      Head: Normocephalic and atraumatic.      Mouth/Throat:      Mouth: Mucous membranes are moist.   Eyes:      Extraocular Movements: Extraocular movements intact.      Conjunctiva/sclera: Conjunctivae normal. "   Cardiovascular:      Rate and Rhythm: Normal rate and regular rhythm.   Pulmonary:      Effort: Pulmonary effort is normal.      Breath sounds: Normal breath sounds.   Abdominal:      General: Bowel sounds are normal.      Palpations: Abdomen is soft.      Tenderness: There is no abdominal tenderness.   Musculoskeletal:      Cervical back: Normal range of motion.   Skin:     General: Skin is warm and dry.   Neurological:      General: No focal deficit present.      Mental Status: He is alert and oriented to person, place, and time.   Psychiatric:         Mood and Affect: Mood normal.         Behavior: Behavior normal.         Labs: Reviewed  WBC   Date Value Ref Range Status   06/28/2022 7.6 4.5 - 11.5 x10(3)/mcL Final     Hgb   Date Value Ref Range Status   06/28/2022 17.3 14.0 - 18.0 gm/dL Final     Hct   Date Value Ref Range Status   06/28/2022 52.5 (H) 42.0 - 52.0 % Final     MCV   Date Value Ref Range Status   06/28/2022 89.3 80.0 - 94.0 fL Final       Carbon Dioxide   Date Value Ref Range Status   10/08/2021 24 22 - 29 mmol/L Final     Blood Urea Nitrogen   Date Value Ref Range Status   10/08/2021 9.5 8.9 - 20.6 mg/dL Final     Creatinine   Date Value Ref Range Status   10/08/2021 0.75 0.73 - 1.18 mg/dL Final     Bilirubin Direct   Date Value Ref Range Status   10/08/2021 0.1 0.0 - 0.5 mg/dL Final     Bilirubin Indirect   Date Value Ref Range Status   10/08/2021 0.20 0.00 - 0.80 mg/dL Final     Alkaline Phosphatase   Date Value Ref Range Status   10/08/2021 87 40 - 150 unit/L Final     Aspartate Aminotransferase   Date Value Ref Range Status   10/08/2021 18 5 - 34 unit/L Final     Alanine Aminotransferase   Date Value Ref Range Status   10/08/2021 22 0 - 55 unit/L Final     Protein Total   Date Value Ref Range Status   10/08/2021 7.3 6.4 - 8.3 gm/dL Final     Albumin Level   Date Value Ref Range Status   10/08/2021 3.8 3.5 - 5.0 gm/dL Final       No results found for: INR, PT, APTT        Assessment/Plan:    Problem List Items Addressed This Visit        Oncology    Iron deficiency anemia - Primary     October 2021: Hgb 12.4 g/dL   November 2021: Iron 28, iron sat 7%, ferritin 5.72  Not compliant with oral iron supplementation  Recheck CBC, iron panel, ferritin today           Relevant Orders    Iron and TIBC (Completed)    Ferritin (Completed)    CBC Auto Differential (Completed)       GI    RESOLVED: Hemorrhoids, internal, with bleeding     Resolved.   Recommend soluble fiber   Hemorrhoid banding is an option if ever indicated                         Follow up: prn

## 2022-06-30 ENCOUNTER — TELEPHONE (OUTPATIENT)
Dept: GASTROENTEROLOGY | Facility: CLINIC | Age: 37
End: 2022-06-30
Payer: MEDICAID

## 2022-06-30 NOTE — TELEPHONE ENCOUNTER
----- Message from Nel Galarza MD sent at 6/28/2022  5:59 PM CDT -----  HCS Control Systemst message sent to the patient about results.  Please call patient with results if not viewed.     Thanks,  MB

## 2022-08-26 ENCOUNTER — OFFICE VISIT (OUTPATIENT)
Dept: FAMILY MEDICINE | Facility: CLINIC | Age: 37
End: 2022-08-26
Payer: COMMERCIAL

## 2022-08-26 VITALS
RESPIRATION RATE: 18 BRPM | HEART RATE: 75 BPM | TEMPERATURE: 98 F | SYSTOLIC BLOOD PRESSURE: 143 MMHG | HEIGHT: 63 IN | BODY MASS INDEX: 41.21 KG/M2 | DIASTOLIC BLOOD PRESSURE: 91 MMHG | OXYGEN SATURATION: 98 % | WEIGHT: 232.56 LBS

## 2022-08-26 DIAGNOSIS — E03.9 HYPOTHYROIDISM, UNSPECIFIED TYPE: ICD-10-CM

## 2022-08-26 DIAGNOSIS — R03.0 ELEVATED BLOOD PRESSURE READING: ICD-10-CM

## 2022-08-26 DIAGNOSIS — R07.89 ATYPICAL CHEST PAIN: ICD-10-CM

## 2022-08-26 DIAGNOSIS — K21.9 GASTROESOPHAGEAL REFLUX DISEASE, UNSPECIFIED WHETHER ESOPHAGITIS PRESENT: Primary | ICD-10-CM

## 2022-08-26 PROBLEM — E55.9 VITAMIN D DEFICIENCY: Status: ACTIVE | Noted: 2022-08-26

## 2022-08-26 PROBLEM — E34.9 HYPOTESTOSTERONEMIA: Status: ACTIVE | Noted: 2018-02-19

## 2022-08-26 PROBLEM — E22.1 HYPERPROLACTINEMIA: Status: ACTIVE | Noted: 2018-02-19

## 2022-08-26 PROBLEM — D50.9 MICROCYTIC ANEMIA: Status: ACTIVE | Noted: 2022-08-26

## 2022-08-26 PROBLEM — R79.89 INCREASED PROLACTIN LEVEL: Status: ACTIVE | Noted: 2022-08-26

## 2022-08-26 LAB
ANION GAP SERPL CALC-SCNC: 9 MEQ/L
BUN SERPL-MCNC: 14.7 MG/DL (ref 8.9–20.6)
CALCIUM SERPL-MCNC: 9.7 MG/DL (ref 8.4–10.2)
CHLORIDE SERPL-SCNC: 105 MMOL/L (ref 98–107)
CO2 SERPL-SCNC: 26 MMOL/L (ref 22–29)
CREAT SERPL-MCNC: 0.95 MG/DL (ref 0.73–1.18)
CREAT/UREA NIT SERPL: 15
GFR SERPLBLD CREATININE-BSD FMLA CKD-EPI: >60 MLS/MIN/1.73/M2
GLUCOSE SERPL-MCNC: 77 MG/DL (ref 74–100)
POTASSIUM SERPL-SCNC: 4 MMOL/L (ref 3.5–5.1)
SODIUM SERPL-SCNC: 140 MMOL/L (ref 136–145)
T4 FREE SERPL-MCNC: 0.93 NG/DL (ref 0.7–1.48)
TSH SERPL-ACNC: 19.45 UIU/ML (ref 0.35–4.94)

## 2022-08-26 PROCEDURE — 84443 ASSAY THYROID STIM HORMONE: CPT

## 2022-08-26 PROCEDURE — 93005 ELECTROCARDIOGRAM TRACING: CPT

## 2022-08-26 PROCEDURE — 99214 OFFICE O/P EST MOD 30 MIN: CPT | Mod: PBBFAC

## 2022-08-26 PROCEDURE — 84439 ASSAY OF FREE THYROXINE: CPT

## 2022-08-26 PROCEDURE — 80048 BASIC METABOLIC PNL TOTAL CA: CPT

## 2022-08-26 PROCEDURE — 36415 COLL VENOUS BLD VENIPUNCTURE: CPT

## 2022-08-26 NOTE — PROGRESS NOTES
"[facility]  Office Visit Note    Subjective:      Patient ID: Leonid Whiting, : 1985.    Chief Complaint: Chest Pain      36 y.o. male presents for f/u, c/o chest pain for 1 month.    Chest pain:  Low, central chest pain which is tight/burning in character, 8/10 intensity, does not radiate.  Onset 1 month ago.  Sx unchanged since onset.  Comes on with intake of foot at times, hot coffee.  Aggravated by lying flat.  Improves to 4/10 with Tums.  Episodes last up to 5 min and self-resolve.  No associated symptoms.  Denies weakness, fatigue, HA, dizziness, diaphoresis, vision changes, dyspnea, N/V, or edema.  Drinks maybe 1 daiquiri/week.  Denies tobacco or other drug use.    Hypothyroidism:  Had weight loss to 195 lb from 250 lb after starting levothyroxine.  Now gaining weight again.  Not exercising or dieting at present.  Admits to occasional palpitations with exertion at work which resolve with rest.    Iron deficiency anemia:  Compliant with ferrous gluconate 324 mg qd.  Rectal bleeding 2/2 hemorrhoids has resolved.  Has not been needing Surfak or MiraLax.  Denies melena or hematochezia.      ROS  As per HPI      Current Outpatient Medications:     hydrocortisone 2.5 % cream, Apply topically., Disp: , Rfl:     levothyroxine (SYNTHROID, LEVOTHROID) 175 MCG tablet, Take 1 tablet (175 mcg total) by mouth before breakfast., Disp: 30 tablet, Rfl: 3    multivitamin with minerals tablet, Take 1 tablet by mouth once daily., Disp: , Rfl:     docusate calcium (SURFAK) 240 mg capsule, Take 240 mg by mouth once daily., Disp: , Rfl:     polyethylene glycol (GLYCOLAX) 17 gram PwPk, Take 17 g by mouth once daily., Disp: , Rfl:     Objective:     PHYSICAL EXAM  Blood pressure (!) 143/91, pulse 75, temperature 98.1 °F (36.7 °C), temperature source Oral, resp. rate 18, height 5' 3" (1.6 m), weight 105.5 kg (232 lb 9.4 oz), SpO2 98 %.    General: Pleasant, obese male, NAD   Eyes: EOMI  Neck: Supple, no thyromegaly   Chest: " Respirations nonlabored, CTAB   CV: RRR, distal pulses intact, no peripheral edema  Abdomen: Soft, nontender, nondistended, normoactive bowel sounds, no renal bruit   Neuro: AA&Ox4    Assessment:     1. Gastroesophageal reflux disease, unspecified whether esophagitis present    2. Elevated blood pressure reading    3. Atypical chest pain    4. Hypothyroidism, unspecified type         Plan:     Atypical chest pain episodes consistent with GERD.  Avoid triggers, discussed in office today.  Trial of esomeprazole 20 mg qd 1 hour prior to breakfast.    For elevated BP, check BP once daily and bring log (provided) to f/u visit.    For transient palpitations with exertional chest pain, EKG performed in office today revealing LAD; otherwise unremarkable.  LAD likely secondary to Hx of exercise regimen.  Due to new elevated readings of BP, low threshold for additional workup pending home BP measurements.    Repeat TSH & BMP due to palpitations; last TSH 10/2021.      Follow up in about 1 month (around 9/26/2022).    Remy Chawla MD  HO-III  [facility]      Orders Placed This Encounter   Procedures    Basic Metabolic Panel    TSH    T4, Free          IN OFFICE EKG 12-LEAD (to Gerald)       New Prescriptions    No medications on file     Discontinued Medications    FERROUS GLUCONATE (FERGON) 324 MG TABLET    Take 324 mg by mouth once daily at 6am.     Modified Medications    No medications on file

## 2022-09-26 ENCOUNTER — OFFICE VISIT (OUTPATIENT)
Dept: FAMILY MEDICINE | Facility: CLINIC | Age: 37
End: 2022-09-26
Payer: COMMERCIAL

## 2022-09-26 VITALS
BODY MASS INDEX: 41.91 KG/M2 | OXYGEN SATURATION: 100 % | WEIGHT: 236.56 LBS | HEART RATE: 76 BPM | SYSTOLIC BLOOD PRESSURE: 135 MMHG | HEIGHT: 63 IN | TEMPERATURE: 98 F | DIASTOLIC BLOOD PRESSURE: 85 MMHG

## 2022-09-26 DIAGNOSIS — E03.9 HYPOTHYROIDISM, UNSPECIFIED TYPE: Primary | ICD-10-CM

## 2022-09-26 PROCEDURE — 99213 OFFICE O/P EST LOW 20 MIN: CPT | Mod: PBBFAC

## 2022-09-26 NOTE — PROGRESS NOTES
35 y/o male here for routine f/u.     Acute issues/CC:  No issues or complaints.     Chronic issues:  Hypothyroidism   -Currently taking Levothyroxine 175 mcg  -Last TSH was elevated on 8/26/22  -Patient reports he has missed a few doses of his medications, but no more than 3-4 days prior to 8/26 lab draw     ROS  See above     PE  Vitals:    09/26/22 1419   BP: 135/85   Pulse: 76   Temp: 98.1 °F (36.7 °C)   General: The patient is pleasant and cooperative and in no acute distress.  Chest: Respirations are non-labored.  Clear to auscultation with bilateral breath sounds.   Cardiovascular: Regular rate and rhythm.   Abdomen: Soft, non-distended, non-tender, with positive bowel sounds.   Neurological: Alert and oriented. No focal neurologic deficits. Answered questions appropriately.         A/P  Hypothyroidism   -Recheck TSH and free T4 today  -Last TSH was 19.4454 and free T4 was 0.93 on 8/26/22  -If labs are similar to 8/26 results, will try to increase Levothyroxine at that time   -Will call patient regarding lab work and make changes to medications as needed

## 2022-09-26 NOTE — LETTER
September 26, 2022    Leonid Whiting  [address] [place] [address]             [facility]  Family Medicine  [address] [place] [address]  Phone: [phone]   September 26, 2022     Patient: Leonid Whiting   YOB: 1985   Date of Visit: 9/26/2022       To Whom it May Concern:    Leonid Whiting was seen in my clinic on 9/26/2022. He is to return back to work on 9/27/2022    Please excuse him from any classes or work missed.    If you have any questions or concerns, please don't hesitate to call.    Sincerely,         Purvi Ramirez MA

## 2022-12-19 RX ORDER — LEVOTHYROXINE SODIUM 175 UG/1
TABLET ORAL
Qty: 30 TABLET | Refills: 0 | Status: SHIPPED | OUTPATIENT
Start: 2022-12-19 | End: 2023-01-23 | Stop reason: SDUPTHER

## 2022-12-22 ENCOUNTER — OFFICE VISIT (OUTPATIENT)
Dept: FAMILY MEDICINE | Facility: CLINIC | Age: 37
End: 2022-12-22
Payer: COMMERCIAL

## 2022-12-22 VITALS
HEIGHT: 63 IN | BODY MASS INDEX: 41.64 KG/M2 | SYSTOLIC BLOOD PRESSURE: 139 MMHG | OXYGEN SATURATION: 97 % | HEART RATE: 76 BPM | WEIGHT: 235 LBS | TEMPERATURE: 98 F | RESPIRATION RATE: 17 BRPM | DIASTOLIC BLOOD PRESSURE: 86 MMHG

## 2022-12-22 DIAGNOSIS — E03.9 HYPOTHYROIDISM, UNSPECIFIED TYPE: Primary | ICD-10-CM

## 2022-12-22 DIAGNOSIS — Z23 IMMUNIZATION DUE: ICD-10-CM

## 2022-12-22 PROCEDURE — 90686 IIV4 VACC NO PRSV 0.5 ML IM: CPT | Mod: PBBFAC

## 2022-12-22 PROCEDURE — 90471 IMMUNIZATION ADMIN: CPT | Mod: PBBFAC

## 2022-12-22 PROCEDURE — 99214 OFFICE O/P EST MOD 30 MIN: CPT | Mod: PBBFAC,25

## 2022-12-22 RX ADMIN — INFLUENZA VIRUS VACCINE 0.5 ML: 15; 15; 15; 15 SUSPENSION INTRAMUSCULAR at 01:12

## 2022-12-22 NOTE — PROGRESS NOTES
[facility]     38 y/o male here for routine f/u.      Acute issues/CC:  No issues or complaints.      Chronic issues:  Hypothyroidism   -Currently taking Levothyroxine 175 mcg. He reports he has been out of his medication for 2-3 weeks   -Last TSH was elevated on 8/26/22  -Denies fatigue, constipation, hair loss     Social: Denies alcohol/tobacco/illicit drug use     ROS  See above     PE   Vitals:    12/22/22 1258   BP: 139/86   Pulse: 76   Resp: 17   Temp: 98.1 °F (36.7 °C)   General: Pleasant and cooperative male in no acute distress  Lungs: Clear to auscultation bilaterally   Heart: RRR    A/P  Hypothyroidism   -Ordered medication with multiple refills, but sis not send script given it was just filled on 12 /19   -Will recheck TSH in 1-2 months     Discussed with patient regarding screening for Diabetes, HIV, and Hep C and patient was agreeable. Will order along TSH.     RTC in 1-2 months or sooner as needed.

## 2022-12-22 NOTE — PROGRESS NOTES
I have discussed the case with the resident and reviewed the resident's history and physical, assessment, plan, and progress note. I agree with the findings.       Indio Norton MD  [facility]

## 2022-12-30 NOTE — PROGRESS NOTES
Faculty Attestation: Patient was seen in [facility] clinic. Patient was seen and examined by the resident.  I have personally reviewed History of the Present Illness , Review of Systems, PFSH , Physical Exam, Assessment and Plan documented by the resident. I was immediately available throughout the encounter. Importance of adherence to treatment recommendations discussed with patient at the time of the visit. Services were furnished in a primary care center in the outpatient department at a [facility] hospital. I discussed the patient with the resident and agree with resident's findings and plan as documented in the resident note. Sagrario Manriquez MD

## 2023-01-24 RX ORDER — LEVOTHYROXINE SODIUM 175 UG/1
TABLET ORAL
Qty: 30 TABLET | Refills: 0 | Status: SHIPPED | OUTPATIENT
Start: 2023-01-24 | End: 2023-03-01 | Stop reason: SDUPTHER

## 2023-03-01 RX ORDER — LEVOTHYROXINE SODIUM 175 UG/1
TABLET ORAL
Qty: 30 TABLET | Refills: 12 | Status: SHIPPED | OUTPATIENT
Start: 2023-03-01 | End: 2023-05-30 | Stop reason: SDUPTHER

## 2023-03-17 ENCOUNTER — PATIENT MESSAGE (OUTPATIENT)
Dept: RESEARCH | Facility: HOSPITAL | Age: 38
End: 2023-03-17
Payer: COMMERCIAL

## 2023-03-28 ENCOUNTER — OFFICE VISIT (OUTPATIENT)
Dept: FAMILY MEDICINE | Facility: CLINIC | Age: 38
End: 2023-03-28
Payer: COMMERCIAL

## 2023-03-28 VITALS
OXYGEN SATURATION: 97 % | DIASTOLIC BLOOD PRESSURE: 86 MMHG | HEART RATE: 84 BPM | BODY MASS INDEX: 43.61 KG/M2 | SYSTOLIC BLOOD PRESSURE: 129 MMHG | WEIGHT: 237 LBS | HEIGHT: 62 IN | TEMPERATURE: 98 F

## 2023-03-28 DIAGNOSIS — Z11.59 ENCOUNTER FOR HEPATITIS C SCREENING TEST FOR LOW RISK PATIENT: ICD-10-CM

## 2023-03-28 DIAGNOSIS — Z13.1 DIABETES MELLITUS SCREENING: ICD-10-CM

## 2023-03-28 DIAGNOSIS — E03.9 HYPOTHYROIDISM, UNSPECIFIED TYPE: Primary | ICD-10-CM

## 2023-03-28 LAB
EST. AVERAGE GLUCOSE BLD GHB EST-MCNC: 96.8 MG/DL
HBA1C MFR BLD: 5 %
HCV AB SERPL QL IA: NONREACTIVE
HIV 1+2 AB+HIV1 P24 AG SERPL QL IA: NONREACTIVE
T4 FREE SERPL-MCNC: 1.19 NG/DL (ref 0.7–1.48)
TSH SERPL-ACNC: 1.93 UIU/ML (ref 0.35–4.94)

## 2023-03-28 PROCEDURE — 84439 ASSAY OF FREE THYROXINE: CPT

## 2023-03-28 PROCEDURE — 87389 HIV-1 AG W/HIV-1&-2 AB AG IA: CPT

## 2023-03-28 PROCEDURE — 86803 HEPATITIS C AB TEST: CPT

## 2023-03-28 PROCEDURE — 83036 HEMOGLOBIN GLYCOSYLATED A1C: CPT

## 2023-03-28 PROCEDURE — 84443 ASSAY THYROID STIM HORMONE: CPT

## 2023-03-28 PROCEDURE — 36415 COLL VENOUS BLD VENIPUNCTURE: CPT

## 2023-03-28 PROCEDURE — 99213 OFFICE O/P EST LOW 20 MIN: CPT | Mod: PBBFAC

## 2023-03-28 NOTE — PROGRESS NOTES
[facility]      36 y/o male here for routine f/u.      Acute issues/CC:  No issues or complaints.      Chronic issues:  Hypothyroidism   -Currently taking Levothyroxine 175 mcg. He reports he has been out of his medication for 2-3 weeks   -Last TSH was elevated on 8/26/22; but patient has missed his last few scheduled appointments; re-ordered today   -Denies fatigue, constipation, hair loss      Social: Denies alcohol/tobacco/illicit drug use     ROS  See above      PE  Vitals:    03/28/23 1034   BP: 129/86   Pulse: 84   Temp: 98.2 °F (36.8 °C)     General: Pleasant and cooperative male in no acute distress  Lungs: Clear to auscultation bilaterally   Heart: RRR    A/P   Hypothyroidism   -Continue current regimen  -TSH ordered    Healthcare maintenance   -HIV, Hep C, and Hbga1c screening ordered

## 2023-03-28 NOTE — LETTER
March 28, 2023      [facility] [address] [place] [address]  Phone: [phone]       Patient: Leonid Whiting   YOB: 1985  Date of Visit: 03/28/2023    To Whom It May Concern:    Karson Whiting  was at [facility] on 03/28/2023. The patient may return to work/school on 03/28/2023 restrictions. If you have any questions or concerns, or if I can be of further assistance, please do not hesitate to contact me.    Sincerely,    Melvin Chawla MA

## 2023-05-30 ENCOUNTER — OFFICE VISIT (OUTPATIENT)
Dept: FAMILY MEDICINE | Facility: CLINIC | Age: 38
End: 2023-05-30
Payer: COMMERCIAL

## 2023-05-30 VITALS
WEIGHT: 237.63 LBS | HEIGHT: 62 IN | DIASTOLIC BLOOD PRESSURE: 86 MMHG | OXYGEN SATURATION: 98 % | RESPIRATION RATE: 18 BRPM | HEART RATE: 91 BPM | TEMPERATURE: 98 F | SYSTOLIC BLOOD PRESSURE: 134 MMHG | BODY MASS INDEX: 43.73 KG/M2

## 2023-05-30 DIAGNOSIS — Z76.0 MEDICATION REFILL: ICD-10-CM

## 2023-05-30 DIAGNOSIS — E03.9 HYPOTHYROIDISM, UNSPECIFIED TYPE: Primary | ICD-10-CM

## 2023-05-30 DIAGNOSIS — Z79.899 ENCOUNTER FOR MEDICATION REVIEW: ICD-10-CM

## 2023-05-30 PROCEDURE — 99213 OFFICE O/P EST LOW 20 MIN: CPT | Mod: PBBFAC

## 2023-05-30 RX ORDER — LEVOTHYROXINE SODIUM 175 UG/1
TABLET ORAL
Qty: 90 TABLET | Refills: 0 | Status: SHIPPED | OUTPATIENT
Start: 2023-05-30 | End: 2023-09-08 | Stop reason: SDUPTHER

## 2023-05-30 NOTE — PROGRESS NOTES
[facility]      38 y/o male PMH MINGO, hyperprolactinemia, hypothyroid, hypotestosteronemia, vit D def. Last seen 3/2023, no C/C Medication refill       Hypothyroidism   - adh Levothyroxine 175 mcg  -Denies fatigue, d/c, hair loss, palpitations        ROS  See above      PE  Vitals:    05/30/23 1527   BP: 134/86   Pulse: 91   Resp: 18   Temp: 98.1 °F (36.7 °C)     General: no acute distress   HEENT: eyebrows full and thick   Neck: no thyroid nodules appreciated   CVS: RRR  no murmur, no edema  Resp: CTA  GI: nonTTP        A/P   Hypothyroidism   Medication review and refill    Refilled thyroid med    CBC WNL 6/2022  Iron panel  iron 299, iron sat 77  BMP WNL 8/2022  TSH WNL 3/2023  HIV, Hep C WNL 3/2023   a1c 5.0 3/2023      RTC in 3m     FAIZAN Norton MD HOIII  [facility]

## 2023-05-31 NOTE — PROGRESS NOTES
Faculty Attestation: Leonid Whiting III  was seen in Family Medicine Clinic. Patient chart reviewed. History of Present Illness, Physical Exam, and Assessment and Plan reviewed. Treatment plan is reasonable and appropriate. Compliance with treatment recommendations is important.       Trey Champagne MD

## 2023-09-10 RX ORDER — LEVOTHYROXINE SODIUM 175 UG/1
TABLET ORAL
Qty: 90 TABLET | Refills: 1 | Status: SHIPPED | OUTPATIENT
Start: 2023-09-10 | End: 2023-10-09 | Stop reason: SDUPTHER

## 2023-10-09 ENCOUNTER — OFFICE VISIT (OUTPATIENT)
Dept: FAMILY MEDICINE | Facility: CLINIC | Age: 38
End: 2023-10-09
Payer: COMMERCIAL

## 2023-10-09 VITALS
RESPIRATION RATE: 20 BRPM | TEMPERATURE: 98 F | DIASTOLIC BLOOD PRESSURE: 85 MMHG | HEART RATE: 76 BPM | BODY MASS INDEX: 43.5 KG/M2 | OXYGEN SATURATION: 100 % | WEIGHT: 236.38 LBS | SYSTOLIC BLOOD PRESSURE: 145 MMHG | HEIGHT: 62 IN

## 2023-10-09 DIAGNOSIS — R03.0 ELEVATED BLOOD PRESSURE READING: ICD-10-CM

## 2023-10-09 DIAGNOSIS — E03.9 ACQUIRED HYPOTHYROIDISM: Primary | ICD-10-CM

## 2023-10-09 DIAGNOSIS — E55.9 VITAMIN D DEFICIENCY: ICD-10-CM

## 2023-10-09 PROCEDURE — 99214 OFFICE O/P EST MOD 30 MIN: CPT | Mod: PBBFAC | Performed by: STUDENT IN AN ORGANIZED HEALTH CARE EDUCATION/TRAINING PROGRAM

## 2023-10-09 RX ORDER — LEVOTHYROXINE SODIUM 175 UG/1
TABLET ORAL
Qty: 90 TABLET | Refills: 1 | Status: SHIPPED | OUTPATIENT
Start: 2023-10-09

## 2023-10-09 RX ORDER — NEBULIZER AND COMPRESSOR
1 EACH MISCELLANEOUS ONCE
Refills: 0 | COMMUNITY
Start: 2023-10-09 | End: 2023-10-09

## 2023-10-10 NOTE — PROGRESS NOTES
[facility]      CC- hypothyroidism, medication refill    36 y/o male PMH MINGO, hyperprolactinemia, hypothyroid, hypotestosteronemia, vit D def, pituitary microadenoma.     Hypothyroidism   - Compliant with Levothyroxine 175 mcg  - TFTs wnl 3/2023  - Denies fatigue, d/c, hair loss, palpitations      Elevated BP  - Elevated in office, consistent with previous visit  - Risk factors obesity, FH of HTN, poor diet  - No headaches, vision changes, alcohol/excessive caffeine consumption, known snoring      Problems not addressed today:   Pituitary microadenoma (hyperprolactinemia, hypo testosterone, hypothyroidism)  - last MRI was in 2018 0.4 cm at that time  - was following with [facility] post discharge in 2019 as hyperprolactinemia and hypo testosterone had resolved.  No monitoring of these levels since 2021  - hypothyroidism been stable on Synthroid 175 mcg  History of vitamin-D deficiency- no current supplementation.  Normal blood levels 02/2021  History of iron-deficiency anemia- Anemia improved after repletion 6/2022. not currently on Fe supplementation. No significant symptoms          ROS  See above      PE  Vitals:    10/09/23 1619   BP: (!) 145/85   Pulse:    Resp:    Temp:      General: no acute distress   HEENT: eyebrows full and thick   Neck: no thyroid nodules appreciated   CVS: RRR  no murmur, no edema  Resp: CTA  GI: nonTTP        A/P   Hypothyroidism   Vit D deficiency  - Refilled thyroid med  - TSH, T4, lipid panel and vit D ordered to performed in lab at later time    Elevated BP  - BP consistently elevated multiple visits, FH of HTN  - Discussed dietary and lifestyle modifications, pt opts to not start meds at this time  - Prescription for blood pressure cuff provided, instructed to perform readings at least 3 times weekly.  Upload results through my [facility].     RTC in 3 months, sooner if needed    Nato Melvin MD  FM Resident HO-3

## 2023-10-16 NOTE — PROGRESS NOTES
I reviewed History, PE, A/P and chart was reviewed.  Services provided in the outpatient department of  a teaching facility, I was immediately available.  I agree with resident, care reasonable and necessary.   Management discussed with resident at time of visit.    Rec re-establish with endo    Needs BP recheck, can record outside BP in telephone encounter    Maylin Mariano MD  [facility] - [place]  [facility]

## 2025-07-01 NOTE — ANESTHESIA PREPROCEDURE EVALUATION
"                                                                                                             05/04/2022  Leonid Whiting III is a 36 y.o., male. With PMHx of hypothyroidism, obesity presents for colonoscopy secondary to rectal bleeding.       Pre-op Assessment    I have reviewed the NPO Status.      Review of Systems  Anesthesia Hx:  No problems with previous Anesthesia    Social:  Non-Smoker    Cardiovascular:  Cardiovascular Normal     Pulmonary:  Pulmonary Normal    Renal/:  Renal/ Normal     Hepatic/GI:  Hepatic/GI Normal    Neurological:  Neurology Normal    Endocrine:   Hypothyroidism      Vitals:    04/28/22 0815 05/04/22 0722 05/04/22 0723   BP:  (!) 140/95 (!) 140/95   Pulse:  60    Resp:  20    Temp:  36.7 °C (98.1 °F)    SpO2:  100%    Weight: 99.4 kg (219 lb 2.2 oz) 98 kg (216 lb 0.8 oz)    Height: 5' 2.99" (1.6 m) 5' 2" (1.575 m)        Physical Exam  General: Well nourished, Cooperative and Alert    Airway:  Mallampati: II   Mouth Opening: Normal  TM Distance: Normal  Tongue: Normal  Neck ROM: Normal ROM    Dental:  Intact    Chest/Lungs:  Clear to auscultation, Normal Respiratory Rate    Heart:  Rate: Normal  Rhythm: Regular Rhythm  Sounds: Normal        Anesthesia Plan  Type of Anesthesia, risks & benefits discussed:    Anesthesia Type: Gen Natural Airway  Intra-op Monitoring Plan: Standard ASA Monitors  Induction:  IV  Informed Consent: Informed consent signed with the Patient and all parties understand the risks and agree with anesthesia plan.  All questions answered.   ASA Score: 2  Day of Surgery Review of History & Physical: H&P Update referred to the surgeon/provider.    Ready For Surgery From Anesthesia Perspective.     .      "
Alcohol intoxication

## (undated) DEVICE — MANIFOLD 4 PORT

## (undated) DEVICE — KIT SURGICAL COLON .25 1.1OZ

## (undated) DEVICE — SOL IRRI STRL WATER 1000ML